# Patient Record
Sex: FEMALE | Race: WHITE | HISPANIC OR LATINO
[De-identification: names, ages, dates, MRNs, and addresses within clinical notes are randomized per-mention and may not be internally consistent; named-entity substitution may affect disease eponyms.]

---

## 2017-01-04 ENCOUNTER — APPOINTMENT (OUTPATIENT)
Dept: OBGYN | Facility: CLINIC | Age: 42
End: 2017-01-04

## 2017-01-04 ENCOUNTER — LABORATORY RESULT (OUTPATIENT)
Age: 42
End: 2017-01-04

## 2017-01-04 ENCOUNTER — OUTPATIENT (OUTPATIENT)
Dept: OUTPATIENT SERVICES | Facility: HOSPITAL | Age: 42
LOS: 1 days | End: 2017-01-04
Payer: MEDICAID

## 2017-01-04 VITALS
HEIGHT: 65 IN | BODY MASS INDEX: 25.68 KG/M2 | SYSTOLIC BLOOD PRESSURE: 118 MMHG | WEIGHT: 154.13 LBS | DIASTOLIC BLOOD PRESSURE: 79 MMHG

## 2017-01-04 DIAGNOSIS — Z87.39 PERSONAL HISTORY OF OTHER DISEASES OF THE MUSCULOSKELETAL SYSTEM AND CONNECTIVE TISSUE: ICD-10-CM

## 2017-01-04 DIAGNOSIS — Z87.2 PERSONAL HISTORY OF DISEASES OF THE SKIN AND SUBCUTANEOUS TISSUE: ICD-10-CM

## 2017-01-04 DIAGNOSIS — M51.36 OTHER INTERVERTEBRAL DISC DEGENERATION, LUMBAR REGION: ICD-10-CM

## 2017-01-04 DIAGNOSIS — M54.32 SCIATICA, LEFT SIDE: ICD-10-CM

## 2017-01-04 DIAGNOSIS — Z87.898 PERSONAL HISTORY OF OTHER SPECIFIED CONDITIONS: ICD-10-CM

## 2017-01-04 DIAGNOSIS — Z86.69 PERSONAL HISTORY OF OTHER DISEASES OF THE NERVOUS SYSTEM AND SENSE ORGANS: ICD-10-CM

## 2017-01-04 DIAGNOSIS — Z86.11 PERSONAL HISTORY OF TUBERCULOSIS: ICD-10-CM

## 2017-01-04 DIAGNOSIS — L81.8 OTHER SPECIFIED DISORDERS OF PIGMENTATION: ICD-10-CM

## 2017-01-04 DIAGNOSIS — M54.16 RADICULOPATHY, LUMBAR REGION: ICD-10-CM

## 2017-01-04 DIAGNOSIS — H91.90 UNSPECIFIED HEARING LOSS, UNSPECIFIED EAR: ICD-10-CM

## 2017-01-04 DIAGNOSIS — D22.9 MELANOCYTIC NEVI, UNSPECIFIED: ICD-10-CM

## 2017-01-04 DIAGNOSIS — Z87.09 PERSONAL HISTORY OF OTHER DISEASES OF THE RESPIRATORY SYSTEM: ICD-10-CM

## 2017-01-04 DIAGNOSIS — N76.0 ACUTE VAGINITIS: ICD-10-CM

## 2017-01-04 DIAGNOSIS — Z86.39 PERSONAL HISTORY OF OTHER ENDOCRINE, NUTRITIONAL AND METABOLIC DISEASE: ICD-10-CM

## 2017-01-04 DIAGNOSIS — I83.90 ASYMPTOMATIC VARICOSE VEINS OF UNSPECIFIED LOWER EXTREMITY: ICD-10-CM

## 2017-01-04 DIAGNOSIS — H54.7 UNSPECIFIED VISUAL LOSS: ICD-10-CM

## 2017-01-04 DIAGNOSIS — Z01.419 ENCOUNTER FOR GYNECOLOGICAL EXAMINATION (GENERAL) (ROUTINE) W/OUT ABNORMAL FINDINGS: ICD-10-CM

## 2017-01-04 DIAGNOSIS — Z01.419 ENCOUNTER FOR GYNECOLOGICAL EXAMINATION (GENERAL) (ROUTINE) WITHOUT ABNORMAL FINDINGS: ICD-10-CM

## 2017-01-04 DIAGNOSIS — M25.561 PAIN IN RIGHT KNEE: ICD-10-CM

## 2017-01-04 PROCEDURE — 87591 N.GONORRHOEAE DNA AMP PROB: CPT

## 2017-01-04 PROCEDURE — G0463: CPT

## 2017-01-04 PROCEDURE — 87491 CHLMYD TRACH DNA AMP PROBE: CPT

## 2017-01-05 LAB
C TRACH RRNA SPEC QL NAA+PROBE: SIGNIFICANT CHANGE UP
C TRACH RRNA SPEC QL NAA+PROBE: SIGNIFICANT CHANGE UP
GC AMPLIFICATION INTERPRETATION: SIGNIFICANT CHANGE UP
N GONORRHOEA RRNA SPEC QL NAA+PROBE: SIGNIFICANT CHANGE UP
SPECIMEN SOURCE: SIGNIFICANT CHANGE UP

## 2017-07-07 ENCOUNTER — APPOINTMENT (OUTPATIENT)
Dept: INTERNAL MEDICINE | Facility: CLINIC | Age: 42
End: 2017-07-07

## 2017-07-07 ENCOUNTER — OUTPATIENT (OUTPATIENT)
Dept: OUTPATIENT SERVICES | Facility: HOSPITAL | Age: 42
LOS: 1 days | End: 2017-07-07
Payer: MEDICAID

## 2017-07-07 VITALS
BODY MASS INDEX: 25.33 KG/M2 | HEIGHT: 65 IN | WEIGHT: 152 LBS | DIASTOLIC BLOOD PRESSURE: 70 MMHG | SYSTOLIC BLOOD PRESSURE: 110 MMHG

## 2017-07-07 DIAGNOSIS — I10 ESSENTIAL (PRIMARY) HYPERTENSION: ICD-10-CM

## 2017-07-07 PROCEDURE — G0463: CPT

## 2017-07-11 DIAGNOSIS — G89.29 OTHER CHRONIC PAIN: ICD-10-CM

## 2017-07-11 DIAGNOSIS — M54.5 LOW BACK PAIN: ICD-10-CM

## 2017-12-19 ENCOUNTER — APPOINTMENT (OUTPATIENT)
Dept: INTERNAL MEDICINE | Facility: CLINIC | Age: 42
End: 2017-12-19
Payer: MEDICAID

## 2017-12-19 ENCOUNTER — MED ADMIN CHARGE (OUTPATIENT)
Age: 42
End: 2017-12-19

## 2017-12-19 PROCEDURE — G0008: CPT

## 2017-12-19 PROCEDURE — 90688 IIV4 VACCINE SPLT 0.5 ML IM: CPT

## 2017-12-27 ENCOUNTER — LABORATORY RESULT (OUTPATIENT)
Age: 42
End: 2017-12-27

## 2017-12-27 ENCOUNTER — APPOINTMENT (OUTPATIENT)
Dept: INTERNAL MEDICINE | Facility: CLINIC | Age: 42
End: 2017-12-27
Payer: MEDICAID

## 2017-12-27 ENCOUNTER — OUTPATIENT (OUTPATIENT)
Dept: OUTPATIENT SERVICES | Facility: HOSPITAL | Age: 42
LOS: 1 days | End: 2017-12-27
Payer: MEDICAID

## 2017-12-27 VITALS
SYSTOLIC BLOOD PRESSURE: 120 MMHG | HEART RATE: 81 BPM | BODY MASS INDEX: 26.16 KG/M2 | WEIGHT: 157 LBS | HEIGHT: 65 IN | OXYGEN SATURATION: 98 % | DIASTOLIC BLOOD PRESSURE: 74 MMHG

## 2017-12-27 DIAGNOSIS — I10 ESSENTIAL (PRIMARY) HYPERTENSION: ICD-10-CM

## 2017-12-27 PROCEDURE — G0463: CPT

## 2017-12-27 PROCEDURE — 99213 OFFICE O/P EST LOW 20 MIN: CPT | Mod: GE

## 2017-12-27 PROCEDURE — 82306 VITAMIN D 25 HYDROXY: CPT

## 2017-12-28 LAB — 24R-OH-CALCIDIOL SERPL-MCNC: 21.7 NG/ML — LOW (ref 30–80)

## 2017-12-29 LAB
ALBUMIN SERPL ELPH-MCNC: 4.6 G/DL
ALP BLD-CCNC: 51 U/L
ALT SERPL-CCNC: 23 U/L
ANION GAP SERPL CALC-SCNC: 12 MMOL/L
AST SERPL-CCNC: 14 U/L
BILIRUB SERPL-MCNC: 0.5 MG/DL
BUN SERPL-MCNC: 11 MG/DL
CALCIUM SERPL-MCNC: 10 MG/DL
CHLORIDE SERPL-SCNC: 99 MMOL/L
CO2 SERPL-SCNC: 26 MMOL/L
CREAT SERPL-MCNC: 0.64 MG/DL
GLUCOSE SERPL-MCNC: 106 MG/DL
POTASSIUM SERPL-SCNC: 4.9 MMOL/L
PROT SERPL-MCNC: 7.8 G/DL
SODIUM SERPL-SCNC: 137 MMOL/L

## 2018-01-02 DIAGNOSIS — E55.9 VITAMIN D DEFICIENCY, UNSPECIFIED: ICD-10-CM

## 2018-01-02 DIAGNOSIS — K82.4 CHOLESTEROLOSIS OF GALLBLADDER: ICD-10-CM

## 2019-09-06 ENCOUNTER — TRANSCRIPTION ENCOUNTER (OUTPATIENT)
Age: 44
End: 2019-09-06

## 2019-09-26 ENCOUNTER — TRANSCRIPTION ENCOUNTER (OUTPATIENT)
Age: 44
End: 2019-09-26

## 2019-09-28 ENCOUNTER — EMERGENCY (EMERGENCY)
Facility: HOSPITAL | Age: 44
LOS: 1 days | Discharge: HOME | End: 2019-09-28
Attending: EMERGENCY MEDICINE
Payer: SELF-PAY

## 2019-09-28 VITALS
TEMPERATURE: 99 F | HEART RATE: 87 BPM | OXYGEN SATURATION: 99 % | DIASTOLIC BLOOD PRESSURE: 77 MMHG | SYSTOLIC BLOOD PRESSURE: 122 MMHG | RESPIRATION RATE: 18 BRPM

## 2019-09-28 VITALS
DIASTOLIC BLOOD PRESSURE: 85 MMHG | TEMPERATURE: 100 F | RESPIRATION RATE: 18 BRPM | HEART RATE: 98 BPM | HEIGHT: 65 IN | OXYGEN SATURATION: 99 % | WEIGHT: 164.91 LBS | SYSTOLIC BLOOD PRESSURE: 129 MMHG

## 2019-09-28 DIAGNOSIS — H60.90 UNSPECIFIED OTITIS EXTERNA, UNSPECIFIED EAR: ICD-10-CM

## 2019-09-28 LAB
ALBUMIN SERPL ELPH-MCNC: 3.9 G/DL — SIGNIFICANT CHANGE UP (ref 3.3–5)
ALP SERPL-CCNC: 55 U/L — SIGNIFICANT CHANGE UP (ref 40–120)
ALT FLD-CCNC: 24 U/L — SIGNIFICANT CHANGE UP (ref 10–45)
ANION GAP SERPL CALC-SCNC: 8 MMOL/L — SIGNIFICANT CHANGE UP (ref 5–17)
AST SERPL-CCNC: 10 U/L — SIGNIFICANT CHANGE UP (ref 10–40)
BASOPHILS # BLD AUTO: 0 K/UL — SIGNIFICANT CHANGE UP (ref 0–0.2)
BASOPHILS NFR BLD AUTO: 0.3 % — SIGNIFICANT CHANGE UP (ref 0–2)
BILIRUB SERPL-MCNC: 0.3 MG/DL — SIGNIFICANT CHANGE UP (ref 0.2–1.2)
BUN SERPL-MCNC: 8 MG/DL — SIGNIFICANT CHANGE UP (ref 7–23)
CALCIUM SERPL-MCNC: 8.6 MG/DL — SIGNIFICANT CHANGE UP (ref 8.4–10.5)
CHLORIDE SERPL-SCNC: 100 MMOL/L — SIGNIFICANT CHANGE UP (ref 96–108)
CO2 SERPL-SCNC: 25 MMOL/L — SIGNIFICANT CHANGE UP (ref 22–31)
CREAT SERPL-MCNC: 0.68 MG/DL — SIGNIFICANT CHANGE UP (ref 0.5–1.3)
EOSINOPHIL # BLD AUTO: 0.1 K/UL — SIGNIFICANT CHANGE UP (ref 0–0.5)
EOSINOPHIL NFR BLD AUTO: 0.5 % — SIGNIFICANT CHANGE UP (ref 0–6)
GLUCOSE SERPL-MCNC: 178 MG/DL — HIGH (ref 70–99)
HCG UR QL: NEGATIVE — SIGNIFICANT CHANGE UP
HCT VFR BLD CALC: 34.8 % — SIGNIFICANT CHANGE UP (ref 34.5–45)
HGB BLD-MCNC: 12.1 G/DL — SIGNIFICANT CHANGE UP (ref 11.5–15.5)
LYMPHOCYTES # BLD AUTO: 1.5 K/UL — SIGNIFICANT CHANGE UP (ref 1–3.3)
LYMPHOCYTES # BLD AUTO: 11.8 % — LOW (ref 13–44)
MCHC RBC-ENTMCNC: 32.5 PG — SIGNIFICANT CHANGE UP (ref 27–34)
MCHC RBC-ENTMCNC: 34.8 GM/DL — SIGNIFICANT CHANGE UP (ref 32–36)
MCV RBC AUTO: 93.5 FL — SIGNIFICANT CHANGE UP (ref 80–100)
MONOCYTES # BLD AUTO: 1.2 K/UL — HIGH (ref 0–0.9)
MONOCYTES NFR BLD AUTO: 9.2 % — SIGNIFICANT CHANGE UP (ref 2–14)
NEUTROPHILS # BLD AUTO: 10 K/UL — HIGH (ref 1.8–7.4)
NEUTROPHILS NFR BLD AUTO: 78.3 % — HIGH (ref 43–77)
PLATELET # BLD AUTO: 321 K/UL — SIGNIFICANT CHANGE UP (ref 150–400)
POTASSIUM SERPL-MCNC: 4 MMOL/L — SIGNIFICANT CHANGE UP (ref 3.5–5.3)
POTASSIUM SERPL-SCNC: 4 MMOL/L — SIGNIFICANT CHANGE UP (ref 3.5–5.3)
PROT SERPL-MCNC: 7 G/DL — SIGNIFICANT CHANGE UP (ref 6–8.3)
RBC # BLD: 3.72 M/UL — LOW (ref 3.8–5.2)
RBC # FLD: 10.9 % — SIGNIFICANT CHANGE UP (ref 10.3–14.5)
SODIUM SERPL-SCNC: 133 MMOL/L — LOW (ref 135–145)
WBC # BLD: 12.8 K/UL — HIGH (ref 3.8–10.5)
WBC # FLD AUTO: 12.8 K/UL — HIGH (ref 3.8–10.5)

## 2019-09-28 PROCEDURE — 70481 CT ORBIT/EAR/FOSSA W/DYE: CPT | Mod: 26

## 2019-09-28 PROCEDURE — 99285 EMERGENCY DEPT VISIT HI MDM: CPT

## 2019-09-28 PROCEDURE — 80053 COMPREHEN METABOLIC PANEL: CPT

## 2019-09-28 PROCEDURE — 70481 CT ORBIT/EAR/FOSSA W/DYE: CPT

## 2019-09-28 PROCEDURE — 87070 CULTURE OTHR SPECIMN AEROBIC: CPT

## 2019-09-28 PROCEDURE — 96376 TX/PRO/DX INJ SAME DRUG ADON: CPT | Mod: XU

## 2019-09-28 PROCEDURE — 99283 EMERGENCY DEPT VISIT LOW MDM: CPT

## 2019-09-28 PROCEDURE — 81025 URINE PREGNANCY TEST: CPT

## 2019-09-28 PROCEDURE — 87186 SC STD MICRODIL/AGAR DIL: CPT

## 2019-09-28 PROCEDURE — 96374 THER/PROPH/DIAG INJ IV PUSH: CPT | Mod: XU

## 2019-09-28 PROCEDURE — 99284 EMERGENCY DEPT VISIT MOD MDM: CPT | Mod: 25

## 2019-09-28 PROCEDURE — 96375 TX/PRO/DX INJ NEW DRUG ADDON: CPT | Mod: XU

## 2019-09-28 PROCEDURE — 85027 COMPLETE CBC AUTOMATED: CPT

## 2019-09-28 RX ORDER — ACETAMINOPHEN 500 MG
975 TABLET ORAL ONCE
Refills: 0 | Status: COMPLETED | OUTPATIENT
Start: 2019-09-28 | End: 2019-09-28

## 2019-09-28 RX ORDER — MORPHINE SULFATE 50 MG/1
4 CAPSULE, EXTENDED RELEASE ORAL ONCE
Refills: 0 | Status: DISCONTINUED | OUTPATIENT
Start: 2019-09-28 | End: 2019-09-28

## 2019-09-28 RX ORDER — OFLOXACIN OTIC SOLUTION 3 MG/ML
1 SOLUTION/ DROPS AURICULAR (OTIC) ONCE
Refills: 0 | Status: DISCONTINUED | OUTPATIENT
Start: 2019-09-28 | End: 2019-09-28

## 2019-09-28 RX ORDER — CIPROFLOXACIN LACTATE 400MG/40ML
400 VIAL (ML) INTRAVENOUS ONCE
Refills: 0 | Status: COMPLETED | OUTPATIENT
Start: 2019-09-28 | End: 2019-09-28

## 2019-09-28 RX ORDER — SODIUM CHLORIDE 0.65 %
2 AEROSOL, SPRAY (ML) NASAL
Qty: 50 | Refills: 0
Start: 2019-09-28 | End: 2019-10-04

## 2019-09-28 RX ORDER — CIPROFLOXACIN HCL 0.3 %
1 DROPS OPHTHALMIC (EYE) ONCE
Refills: 0 | Status: DISCONTINUED | OUTPATIENT
Start: 2019-09-28 | End: 2019-09-28

## 2019-09-28 RX ORDER — CIPROFLOXACIN AND DEXAMETHASONE 3; 1 MG/ML; MG/ML
1 SUSPENSION/ DROPS AURICULAR (OTIC) ONCE
Refills: 0 | Status: DISCONTINUED | OUTPATIENT
Start: 2019-09-28 | End: 2019-09-28

## 2019-09-28 RX ORDER — CIPROFLOXACIN AND DEXAMETHASONE 3; 1 MG/ML; MG/ML
4 SUSPENSION/ DROPS AURICULAR (OTIC)
Qty: 50 | Refills: 0
Start: 2019-09-28 | End: 2019-10-04

## 2019-09-28 RX ORDER — KETOROLAC TROMETHAMINE 30 MG/ML
15 SYRINGE (ML) INJECTION ONCE
Refills: 0 | Status: DISCONTINUED | OUTPATIENT
Start: 2019-09-28 | End: 2019-09-28

## 2019-09-28 RX ORDER — OFLOXACIN 0.3 %
5 DROPS OPHTHALMIC (EYE) ONCE
Refills: 0 | Status: COMPLETED | OUTPATIENT
Start: 2019-09-28 | End: 2019-09-28

## 2019-09-28 RX ORDER — CIPROFLOXACIN LACTATE 400MG/40ML
1 VIAL (ML) INTRAVENOUS
Qty: 6 | Refills: 0
Start: 2019-09-28 | End: 2019-10-03

## 2019-09-28 RX ORDER — SODIUM CHLORIDE 9 MG/ML
1000 INJECTION INTRAMUSCULAR; INTRAVENOUS; SUBCUTANEOUS ONCE
Refills: 0 | Status: COMPLETED | OUTPATIENT
Start: 2019-09-28 | End: 2019-09-28

## 2019-09-28 RX ADMIN — SODIUM CHLORIDE 1000 MILLILITER(S): 9 INJECTION INTRAMUSCULAR; INTRAVENOUS; SUBCUTANEOUS at 04:31

## 2019-09-28 RX ADMIN — Medication 15 MILLIGRAM(S): at 08:12

## 2019-09-28 RX ADMIN — Medication 200 MILLIGRAM(S): at 04:31

## 2019-09-28 RX ADMIN — MORPHINE SULFATE 4 MILLIGRAM(S): 50 CAPSULE, EXTENDED RELEASE ORAL at 05:45

## 2019-09-28 RX ADMIN — Medication 975 MILLIGRAM(S): at 13:57

## 2019-09-28 RX ADMIN — Medication 15 MILLIGRAM(S): at 14:04

## 2019-09-28 RX ADMIN — MORPHINE SULFATE 4 MILLIGRAM(S): 50 CAPSULE, EXTENDED RELEASE ORAL at 05:00

## 2019-09-28 RX ADMIN — Medication 975 MILLIGRAM(S): at 09:12

## 2019-09-28 RX ADMIN — Medication 5 DROP(S): at 10:12

## 2019-09-28 RX ADMIN — Medication 15 MILLIGRAM(S): at 13:58

## 2019-09-28 RX ADMIN — Medication 975 MILLIGRAM(S): at 07:28

## 2019-09-28 RX ADMIN — Medication 975 MILLIGRAM(S): at 14:04

## 2019-09-28 RX ADMIN — MORPHINE SULFATE 4 MILLIGRAM(S): 50 CAPSULE, EXTENDED RELEASE ORAL at 04:32

## 2019-09-28 RX ADMIN — Medication 15 MILLIGRAM(S): at 07:28

## 2019-09-28 NOTE — CONSULT NOTE ADULT - PROBLEM SELECTOR RECOMMENDATION 9
- CT IAC pending.   - Ear wick placed.  - Recommend Ciprodex 4gtt BID to Left Ear x 7 days.   - Also Levaquin PO x 5 days.   - f/u in ENT clinic in 5 days for wick removal.   - f/u Left Ear Cx. can f/u in the clinic for results.   - Pain meds prn.  - Call ENT with questions.     LEI ARMENTA PA-C.   # [id number]  ENT. - CT IAC: Otitis Externa and Otitis Media, no mastoiditis.  - Ear canal cleaned and Culture obtained from Left Ear, followed by Ear wick placement.  - Recommend Ciprodex 4gtt BID to Left Ear x 7 days.   - Also Levaquin PO x 5 days.   - NS nasal spray 0.65% 2gtt TID.   - f/u in ENT clinic in 4-5 days for wick removal.   - f/u Left Ear Cx. can f/u in the clinic for results.   - Pain meds prn.  - Call ENT with questions.     LEI ARMENTA PA-C.   # [id number]  ENT. - CT IAC: Otitis Externa and Otitis Media, no mastoiditis.  - Ear canal cleaned and Culture obtained from Left Ear, followed by Ear wick placement.  - Recommend Ciprodex 4gtt BID to Left Ear x 7 days.   - Also Levaquin PO x 5 days.   - NS nasal spray 0.65% 2gtt TID to b/l Nares.  - f/u in ENT clinic in 4-5 days for wick removal.   - f/u Left Ear Cx. can f/u in the clinic for results.   - Pain meds prn.  - D/W [facility] and agrees with plan of care.   - Call ENT with questions.     LEI ARMENTA PA-C.   # [id number]  ENT.

## 2019-09-28 NOTE — ED PROVIDER NOTE - OBJECTIVE STATEMENT
44F w/out pertinent pmh - p/w L ear pain and drainage x 1 wk worsening. 3 wks ago had L ear pain, went to  got ear drops and improved; similar sx recurred 1 wk ago, however has been worsening despite ofloxacin gtt x 2 days. +low grade temps , taking tylenol/ibuprofen consistently but pain not improving. Took son's percocet 1130pm tonight w/ minimal relief. No d/c, chest / abd pain, cough, dizziness, dysuria/hematuria. No recent travel, or hospitalization.

## 2019-09-28 NOTE — CONSULT NOTE ADULT - ASSESSMENT
45 YO F with no significant PMH presents to ED with Left Ear Pain. Pt with 2 episodes of Left Ear pain this month, doesn't got relived with ocuflox gtt as outpatient,. Also feeling " wet sensation" inside the ear. Pt had a 100.2F fever at home. On Physical exam,  Pain upon Tragus pulling, TTP Pre- auricular area,  EAC Edema with mild purulent drainage, unable to fully visualize TM due to edema, No mastoid tenderness, erythema, or ear bulging. Pt with leukocytosis upto 12.8, with a low grade fever 99.5F in ED. Ear canal cleaned and Culture obtained from Left Ear, followed by Ear wick placement.  CT IAC showed: 45 YO F with no significant PMH presents to ED with Left Ear Pain. Pt with 2 episodes of Left Ear pain this month, doesn't got relived with ocuflox gtt as outpatient,. Also feeling " wet sensation" inside the ear. Pt had a 100.2F fever at home. On Ear exam,  Pain upon Tragus pulling, TTP Pre- auricular area,  EAC Edema with mild purulent drainage, unable to fully visualize TM due to edema, No mastoid tenderness, erythema, or ear bulging. Pt with leukocytosis upto 12.8, with a low grade fever 99.5F in ED. Ear canal cleaned and Culture obtained from Left Ear, followed by Ear wick placement.  CT IAC showed: 43 YO F with no significant PMH presents to ED with Left Ear Pain. Pt with 2 episodes of Left Ear pain this month, doesn't got relived with ocuflox gtt as outpatient,. Also feeling " wet sensation" inside the ear. Pt had a 100.2F fever at home. On Ear exam,  Pain upon Tragus pulling, TTP Pre- auricular area,  EAC Edema with mild purulent drainage, unable to fully visualize TM due to edema, No mastoid tenderness, erythema, or ear bulging. Pt with leukocytosis upto 12.8, with a low grade fever 99.5F in ED. Ear canal cleaned and Culture obtained from Left Ear, followed by Ear wick placement.  CT IAC showed: Otitis Externa and Otitis Media, no mastoiditis

## 2019-09-28 NOTE — ED PROVIDER NOTE - PHYSICAL EXAMINATION
*GEN:   uncomfortable, AOx3  *EYES:   pupils equally round and reactive to light, extra-occular movements intact  *HEENT:   L external ear canal swollen and tender to exam w/ small purulence; L jack-auricular tenderness to pain; airway patent, moist mucosal membranes, full ROM neck  *CV:   regular rate and rhythm  *RESP:   clear to auscultation bilaterally, non-labored  *ABD:   soft, non-tender  *:   no cva/flank tenderness  *EXTREM:   no MSK tenderness, full ROM throughout, no leg swelling  *SKIN:   dry, intact  *NEURO:   AOx3, no focal weakness or loss of sensation

## 2019-09-28 NOTE — ED PROVIDER NOTE - CLINICAL SUMMARY MEDICAL DECISION MAKING FREE TEXT BOX
44F w/ L ear pain concerning for mastoiditis - will check labs, CT head, give antibiotics and sx relief, reassess 44F w/ L ear pain concerning for mastoiditis - will check labs, CT head, give antibiotics and sx relief, reassess    Attending Statement: Agree with the above.  L purulent d/c from ear c pain on pulling/post-auricular ear/+- mastoid.  Pt c significant discomfort.  Will tx c IV cipro, give fluids, check labs, obtain CT IAC, likely s/w ENT.  --BMM

## 2019-09-28 NOTE — ED ADULT TRIAGE NOTE - CHIEF COMPLAINT QUOTE
Patient c/o severe left ear pain for about 3 weeks, gradually getting worse, unable to sleep at night. Took Percocet at 2330 pm. "help for a little bit". Patient went to Urgent care on Thursday and prescribed ear drops antibiotic.

## 2019-09-28 NOTE — ED PROVIDER NOTE - PATIENT PORTAL LINK FT
You can access the FollowMyHealth Patient Portal offered by [facility] by registering at the following website: [email or web address]. By joining [employer]’s FollowMyHealth portal, you will also be able to view your health information using other applications (apps) compatible with our system.

## 2019-09-28 NOTE — ED ADULT NURSE NOTE - CHPI ED NUR SYMPTOMS NEG
no tingling/no decreased eating/drinking/no chills/no dizziness/no fever/no weakness/no nausea/no vomiting

## 2019-09-28 NOTE — ED PROVIDER NOTE - NSFOLLOWUPCLINICS_GEN_ALL_ED_FT
[facility] - ENT  Otolaryngology (ENT)  [address] [place] [address]  Phone: [phone]  Fax:   Follow Up Time: 4-6 Days

## 2019-09-28 NOTE — ED PROVIDER NOTE - CARE PLAN
Principal Discharge DX:	Otitis externa  Secondary Diagnosis:	Otitis media Principal Discharge DX:	Otitis externa  Goal:	L  Secondary Diagnosis:	Otitis media

## 2019-09-28 NOTE — ED PROVIDER NOTE - NSFOLLOWUPINSTRUCTIONS_ED_ALL_ED_FT
Follow up with your primary care doctor  Return to ED for any new or worsening symptoms   Administer Ciprodex 4drops twice a day to Left Ear x 7 days. Keep ear wick in place. Turn head with left ear up and place drops and allow several minutes for wick to absorb.   Take Levaquin as prescribed orally for 5 days  Apply normal saline nasal spray 0.65% 2sprays 3 times a day  Follow up with ENT clinic in 4-5 days for wick removal and follow up Follow up with your primary care doctor  Return to ED for any new or worsening symptoms   Administer Ciprodex 4drops twice a day to Left Ear x 7 days. Keep ear wick in place. Turn head with left ear up and place drops and allow several minutes for wick to absorb.   Take Levaquin as prescribed orally for 5 days  Apply normal saline nasal spray 0.65% 2sprays 3 times a day  Follow up with ENT clinic in 4-5 days for wick removal and follow up  Take motrin 600mg every 6 hours for pain. Can also take tylenol 500mg every 6 hours as needed for pain.

## 2019-09-30 ENCOUNTER — LABORATORY RESULT (OUTPATIENT)
Age: 44
End: 2019-09-30

## 2019-09-30 ENCOUNTER — APPOINTMENT (OUTPATIENT)
Dept: INTERNAL MEDICINE | Facility: CLINIC | Age: 44
End: 2019-09-30
Payer: MEDICAID

## 2019-09-30 ENCOUNTER — OUTPATIENT (OUTPATIENT)
Dept: OUTPATIENT SERVICES | Facility: HOSPITAL | Age: 44
LOS: 1 days | End: 2019-09-30
Payer: SELF-PAY

## 2019-09-30 VITALS
OXYGEN SATURATION: 98 % | SYSTOLIC BLOOD PRESSURE: 138 MMHG | BODY MASS INDEX: 26.99 KG/M2 | HEIGHT: 65 IN | HEART RATE: 77 BPM | DIASTOLIC BLOOD PRESSURE: 80 MMHG | WEIGHT: 162 LBS

## 2019-09-30 VITALS — SYSTOLIC BLOOD PRESSURE: 116 MMHG | DIASTOLIC BLOOD PRESSURE: 72 MMHG

## 2019-09-30 DIAGNOSIS — I10 ESSENTIAL (PRIMARY) HYPERTENSION: ICD-10-CM

## 2019-09-30 DIAGNOSIS — E55.9 VITAMIN D DEFICIENCY, UNSPECIFIED: ICD-10-CM

## 2019-09-30 LAB
CHOLEST SERPL-MCNC: 146 MG/DL — SIGNIFICANT CHANGE UP (ref 10–199)
ESTIMATED AVERAGE GLUCOSE: 126 MG/DL — HIGH (ref 68–114)
HBA1C BLD-MCNC: 6 % — HIGH (ref 4–5.6)
HDLC SERPL-MCNC: 43 MG/DL — LOW
LIPID PNL WITH DIRECT LDL SERPL: 83 MG/DL — SIGNIFICANT CHANGE UP
TOTAL CHOLESTEROL/HDL RATIO MEASUREMENT: 3.4 RATIO — SIGNIFICANT CHANGE UP (ref 3.3–7.1)
TRIGL SERPL-MCNC: 101 MG/DL — SIGNIFICANT CHANGE UP (ref 10–149)

## 2019-09-30 PROCEDURE — 99396 PREV VISIT EST AGE 40-64: CPT | Mod: GC

## 2019-09-30 PROCEDURE — 80061 LIPID PANEL: CPT

## 2019-09-30 PROCEDURE — 83036 HEMOGLOBIN GLYCOSYLATED A1C: CPT

## 2019-09-30 PROCEDURE — G0463: CPT

## 2019-09-30 NOTE — REVIEW OF SYSTEMS
[Earache] : earache [Joint Pain] : joint pain [Fever] : no fever [Chills] : no chills [Sore Throat] : no sore throat [Chest Pain] : no chest pain [Shortness Of Breath] : no shortness of breath [Abdominal Pain] : no abdominal pain [Nausea] : no nausea [Vomiting] : no vomiting [Dysuria] : no dysuria [Back Pain] : no back pain [Itching] : no itching [Skin Rash] : no skin rash [Headache] : no headache [Dizziness] : no dizziness [Depression] : no depression

## 2019-09-30 NOTE — PHYSICAL EXAM
[No Acute Distress] : no acute distress [Well Nourished] : well nourished [PERRL] : pupils equal round and reactive to light [EOMI] : extraocular movements intact [No Respiratory Distress] : no respiratory distress  [No Accessory Muscle Use] : no accessory muscle use [Normal Rate] : normal rate  [Regular Rhythm] : with a regular rhythm [Normal S1, S2] : normal S1 and S2 [No Edema] : there was no peripheral edema [Soft] : abdomen soft [Non Tender] : non-tender [No HSM] : no HSM [Normal Bowel Sounds] : normal bowel sounds [Normal Gait] : normal gait [No Rash] : no rash [Alert and Oriented x3] : oriented to person, place, and time [Normal Oropharynx] : the oropharynx was normal [Normal Posterior Cervical Nodes] : no posterior cervical lymphadenopathy [Normal Anterior Cervical Nodes] : no anterior cervical lymphadenopathy [de-identified] : + yuri in L ear

## 2019-09-30 NOTE — ASSESSMENT
[FreeTextEntry1] : 44 F with no significant PMH who presents for a CPE.\par \par 1) HCM\par - last PAP 2015 negative --> repeat in 2020\par - mammogram referral given \par - CBC and CMP checked at hospital --> lipid panel and A1c ordered\par - will get flu shot at pharmacy \par - ophtho referral given\par \par 2) Acute otitis media/externa\par - patient on oral levofloxacin and cipro/dexamethasone eye drops\par - follow up ENT this week for wick removal \par \par 3) Overweight \par - patient counseled on healthy lifestyle habits including dietary modifications and increase in exercise \par \par 4) Depression \par - patient with PHQ 9 score of 6\par - patient under stress given she is single mother with son with severe anxiety and ADHD\par - no concern for harm to herself or others, she is not interested in medication at this time but would like to speak with someone \par - behavioral health referral given \par \par RTC in 6 months to 1 year

## 2019-09-30 NOTE — HISTORY OF PRESENT ILLNESS
[FreeTextEntry1] : CPE [de-identified] : 43 y/o F with sciatica and headaches who presents for a CPE.\par \par Since her last CPE in December 2017, she has been doing well overall however she developed itching in her L ear in April. She went to an urgent care center and was prescribed 7 days of L ear drops.\par \par She states that 2 weeks ago she developed itching again and she went back to urgent care on Thursday morning and was prescribed more eye drops. The pain in her ear worsened on Friday so she went to the ED and was diagnosed with acute otitis media and externa with cellulitis. She was seen by ENT and had a wick placed in her L ear. She was prescribed PO levoquin and cipro/dexamethasone ear drops. She says her pain is more manageable now and she is to follow up with ENT within the next few days to have the wick removed. \par \par Overall, she states she is concerned about weight gain. She has been cutting out carbs from her diet and tries to use her stationary bike 2x per week. \par \par HCM - Last PAP was in 2015 and was negative so due in 2020. Never had a mammogram in the past. Will order referral today. \par \par She is a single mom with 3 kids. 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 Takes care of her mom in hospice as well so has a lot of stress at home but she is managing it. No smoking, alcohol, or recreational drugs.

## 2019-10-01 LAB
-  AMIKACIN: SIGNIFICANT CHANGE UP
-  AZTREONAM: SIGNIFICANT CHANGE UP
-  CEFEPIME: SIGNIFICANT CHANGE UP
-  CEFTAZIDIME: SIGNIFICANT CHANGE UP
-  CIPROFLOXACIN: SIGNIFICANT CHANGE UP
-  GENTAMICIN: SIGNIFICANT CHANGE UP
-  LEVOFLOXACIN: SIGNIFICANT CHANGE UP
-  MEROPENEM: SIGNIFICANT CHANGE UP
-  PIPERACILLIN/TAZOBACTAM: SIGNIFICANT CHANGE UP
-  TOBRAMYCIN: SIGNIFICANT CHANGE UP
CULTURE RESULTS: SIGNIFICANT CHANGE UP
METHOD TYPE: SIGNIFICANT CHANGE UP
ORGANISM # SPEC MICROSCOPIC CNT: SIGNIFICANT CHANGE UP
ORGANISM # SPEC MICROSCOPIC CNT: SIGNIFICANT CHANGE UP
SPECIMEN SOURCE: SIGNIFICANT CHANGE UP

## 2019-10-02 ENCOUNTER — RESULT REVIEW (OUTPATIENT)
Age: 44
End: 2019-10-02

## 2019-10-02 NOTE — ED POST DISCHARGE NOTE - RESULT SUMMARY
Culture L ear rare P. aeruginosa sensitive to FQ, pt DC'ed on topical and PO levofloxacin, appropriate care received in ED no need for further contact at this time. -Dick Hutchinson PA-C Culture L ear rare P. aeruginosa sensitive to FQ, pt DC'ed on topical and PO levofloxacin. Additionally shows few Candida parapsilosis, pt not DC'ed on antifungals. -Dick Hutchinson PA-C

## 2019-10-02 NOTE — ED POST DISCHARGE NOTE - DETAILS
results d/w pt, sxs improving but pt still has significant pruritus, sent clotrimazole solution to pt preferred pharmacy, pt has f/u with ENT tomorrow, told to return fi change in hearing, severe ear or headache, tinnitus, abnl DC/bleeding from the ear, or if any other concerns. -Dick Hutchinson PA-C

## 2019-10-03 ENCOUNTER — APPOINTMENT (OUTPATIENT)
Dept: OTOLARYNGOLOGY | Facility: CLINIC | Age: 44
End: 2019-10-03
Payer: MEDICAID

## 2019-10-03 VITALS
DIASTOLIC BLOOD PRESSURE: 92 MMHG | WEIGHT: 162 LBS | SYSTOLIC BLOOD PRESSURE: 136 MMHG | HEIGHT: 65 IN | HEART RATE: 94 BPM | BODY MASS INDEX: 26.99 KG/M2

## 2019-10-03 DIAGNOSIS — Z00.00 ENCOUNTER FOR GENERAL ADULT MEDICAL EXAMINATION WITHOUT ABNORMAL FINDINGS: ICD-10-CM

## 2019-10-03 DIAGNOSIS — E55.9 VITAMIN D DEFICIENCY, UNSPECIFIED: ICD-10-CM

## 2019-10-03 DIAGNOSIS — H60.511 ACUTE ACTINIC OTITIS EXTERNA, RIGHT EAR: ICD-10-CM

## 2019-10-03 PROCEDURE — 69210 REMOVE IMPACTED EAR WAX UNI: CPT

## 2019-10-03 PROCEDURE — 99204 OFFICE O/P NEW MOD 45 MIN: CPT | Mod: 25

## 2019-10-03 NOTE — HISTORY OF PRESENT ILLNESS
[No] : patient does not have a  history of radiation therapy [de-identified] : 45 yo female\par 5 day h/o left ear pain -severe and hearing loss\par no diabetes\par Tx in [facility] ER w/ abx and ear wick\par Wick fell out but pt still w/ sx\par chr ear itch and pt uses q-tips\par no other modifying factors\par no nasal or throat complaints\par \par  [Tinnitus] : no tinnitus [Hearing Loss] : hearing loss [Otalgia] : otalgia [Meniere Disease] : no Meniere disease [Eustachian Tube Dysfunction] : no eustachian tube dysfunction [Otosclerosis] : no otosclerosis [Cholesteatoma] : no cholesteatoma [Perilymphatic Fistula] : no perilymphatic fistula [Autoimmune Diseases] : no autoimmune diseases [Hypertension] : no hypertension [Hypotension] : no hypotension [Loud Noise Exposure] : no history of loud noise exposure [Smoking] : no smoking [Early Onset Hearing Loss] : no early onset hearing loss [Stroke] : no stroke [Facial Pain] : no facial pain [Facial Pressure] : no facial pressure [Nasal Congestion] : no nasal congestion [Ear Fullness] : ear fullness [Allergic Rhinitis] : no allergic rhinitis [Seasonal Allergies] : no seasonal allergies [Environmental Allergies] : no environmental allergies [Adenoidectomy] : no adenoidectomy [Environmental Allergens] : no environmental allergens [Asthma] : no asthma [Allergies] : no allergies [Neck Mass] : no neck mass [Neck Pain] : no neck pain [Cold Intolerance] : no cold intolerance [Chills] : no chills [Cough] : no cough [Fatigue] : no fatigue [Hyperthyroidism] : no hyperthyroidism [Heat Intolerance] : no heat intolerance [Sialadenitis] : no sialadenitis [Hodgkin Disease] : no hodgkin disease [Non-Hodgkin Lymphoma] : no non-hodgkin lymphoma [Alcohol Use] : no alcohol use [Tobacco Use] : no tobacco use [Graves Disease] : no graves disease [Thyroid Cancer] : no thyroid cancer

## 2019-10-03 NOTE — PHYSICAL EXAM
[de-identified] : left ear wax and tender w/ erythema [Midline] : trachea located in midline position [Normal] : no rashes

## 2019-10-03 NOTE — ASSESSMENT
[FreeTextEntry1] : Left Otitis externa\par rx-Cipro-po\par Medrol dospak\par d/c otic drops\par h2o precautions\par d/c q-tips\par consoder elocon lotion in future

## 2019-10-23 ENCOUNTER — APPOINTMENT (OUTPATIENT)
Dept: OTOLARYNGOLOGY | Facility: CLINIC | Age: 44
End: 2019-10-23
Payer: MEDICAID

## 2019-10-23 VITALS
SYSTOLIC BLOOD PRESSURE: 139 MMHG | WEIGHT: 162 LBS | HEIGHT: 65 IN | BODY MASS INDEX: 26.99 KG/M2 | HEART RATE: 85 BPM | DIASTOLIC BLOOD PRESSURE: 87 MMHG

## 2019-10-23 DIAGNOSIS — H61.23 IMPACTED CERUMEN, BILATERAL: ICD-10-CM

## 2019-10-23 DIAGNOSIS — J31.0 CHRONIC RHINITIS: ICD-10-CM

## 2019-10-23 DIAGNOSIS — J34.2 DEVIATED NASAL SEPTUM: ICD-10-CM

## 2019-10-23 PROCEDURE — 31231 NASAL ENDOSCOPY DX: CPT

## 2019-10-23 PROCEDURE — 99214 OFFICE O/P EST MOD 30 MIN: CPT | Mod: 25

## 2019-10-25 NOTE — HISTORY OF PRESENT ILLNESS
[No] : patient does not have a  history of radiation therapy [Hearing Loss] : hearing loss [Otalgia] : otalgia [Ear Fullness] : ear fullness [de-identified] : f/u exam s/o otic drop tx\par pain free but still w/ aural itch-moderate L>R\par hx-45 yo female\par  h/o left ear pain -severe and hearing loss\par no diabetes\par Tx in [facility] ER w/ abx and ear wick\par \par Today c/o nasal; congestion and PND-moderate x 1 wk\par Wick fell out but pt still w/ sx\par chr ear itch and pt uses q-tips\par no other modifying factors\par no nasal or throat complaints\par \par  [Tinnitus] : no tinnitus [Meniere Disease] : no Meniere disease [Eustachian Tube Dysfunction] : no eustachian tube dysfunction [Otosclerosis] : no otosclerosis [Cholesteatoma] : no cholesteatoma [Perilymphatic Fistula] : no perilymphatic fistula [Autoimmune Diseases] : no autoimmune diseases [Hypertension] : no hypertension [Hypotension] : no hypotension [Loud Noise Exposure] : no history of loud noise exposure [Smoking] : no smoking [Early Onset Hearing Loss] : no early onset hearing loss [Stroke] : no stroke [Facial Pain] : no facial pain [Facial Pressure] : no facial pressure [Nasal Congestion] : no nasal congestion [Allergic Rhinitis] : no allergic rhinitis [Environmental Allergies] : no environmental allergies [Seasonal Allergies] : no seasonal allergies [Environmental Allergens] : no environmental allergens [Allergies] : no allergies [Adenoidectomy] : no adenoidectomy [Neck Mass] : no neck mass [Asthma] : no asthma [Neck Pain] : no neck pain [Chills] : no chills [Cold Intolerance] : no cold intolerance [Cough] : no cough [Heat Intolerance] : no heat intolerance [Hyperthyroidism] : no hyperthyroidism [Fatigue] : no fatigue [Sialadenitis] : no sialadenitis [Hodgkin Disease] : no hodgkin disease [Non-Hodgkin Lymphoma] : no non-hodgkin lymphoma [Tobacco Use] : no tobacco use [Alcohol Use] : no alcohol use [Graves Disease] : no graves disease [Thyroid Cancer] : no thyroid cancer

## 2019-10-25 NOTE — PHYSICAL EXAM
[Midline] : trachea located in midline position [Normal] : salivary glands are normal [Nasal Endoscopy Performed] : nasal endoscopy was performed, see procedure section for findings [] : septum deviated bilaterally [de-identified] : congested

## 2019-10-25 NOTE — ASSESSMENT
[FreeTextEntry1] : Left Otitis externa resolved\par still w/ aural itch\par rx-Elocon lotion to ears\par d/c q-tips\par \par DNS and Rhinitis\par Rx\par   Steam humidification and hypertonic saline nasal irrigations \par consider Flonase tx\par \par \par \par f/u in spring 2020 and prn

## 2020-01-17 ENCOUNTER — OUTPATIENT (OUTPATIENT)
Dept: OUTPATIENT SERVICES | Facility: HOSPITAL | Age: 45
LOS: 1 days | End: 2020-01-17
Payer: MEDICAID

## 2020-01-17 ENCOUNTER — APPOINTMENT (OUTPATIENT)
Dept: INTERNAL MEDICINE | Facility: CLINIC | Age: 45
End: 2020-01-17
Payer: MEDICAID

## 2020-01-17 VITALS
BODY MASS INDEX: 26.66 KG/M2 | DIASTOLIC BLOOD PRESSURE: 70 MMHG | SYSTOLIC BLOOD PRESSURE: 130 MMHG | WEIGHT: 160 LBS | HEIGHT: 65 IN

## 2020-01-17 DIAGNOSIS — M54.9 DORSALGIA, UNSPECIFIED: ICD-10-CM

## 2020-01-17 DIAGNOSIS — G89.29 DORSALGIA, UNSPECIFIED: ICD-10-CM

## 2020-01-17 DIAGNOSIS — I10 ESSENTIAL (PRIMARY) HYPERTENSION: ICD-10-CM

## 2020-01-17 PROCEDURE — G0463: CPT

## 2020-01-17 PROCEDURE — 99213 OFFICE O/P EST LOW 20 MIN: CPT | Mod: GE

## 2020-01-19 RX ORDER — METHYLPREDNISOLONE 4 MG/1
4 TABLET ORAL
Qty: 1 | Refills: 0 | Status: DISCONTINUED | COMMUNITY
Start: 2019-10-03 | End: 2020-01-17

## 2020-01-19 RX ORDER — CIPROFLOXACIN HYDROCHLORIDE 500 MG/1
500 TABLET, FILM COATED ORAL TWICE DAILY
Qty: 20 | Refills: 1 | Status: DISCONTINUED | COMMUNITY
Start: 2019-10-03 | End: 2020-01-17

## 2020-01-19 RX ORDER — MOMETASONE FUROATE 1 MG/ML
0.1 SOLUTION TOPICAL
Qty: 1 | Refills: 5 | Status: DISCONTINUED | COMMUNITY
Start: 2019-10-23 | End: 2020-01-17

## 2020-01-21 NOTE — PHYSICAL EXAM
[No JVD] : no jugular venous distention [Supple] : supple [Pedal Pulses Present] : the pedal pulses are present [No Edema] : there was no peripheral edema [No Extremity Clubbing/Cyanosis] : no extremity clubbing/cyanosis [Normal] : no rash [Coordination Grossly Intact] : coordination grossly intact [de-identified] : Appearing distressed by pain [de-identified] : Non-tender to palpation in paraspinal muscles but feels very tight [de-identified] : L knee: anterior swelling as well as palpable mass in lower popliteal region, both non-tender to palpation [de-identified] : strength and ROM testing of LEs limited by pain but able to stand by herself and ambulate slowly

## 2020-01-21 NOTE — ASSESSMENT
[FreeTextEntry1] : 44 yo F w/ hx of sciatica and headaches presenting with severe back pain.\par \par #Back pain/tightness, chronic and worsening\par -hx of sciatica with remote childhood injury\par -no previous imaging in chart and pt does not recall having previous imaging done\par -MRI lumbrosacral w/o con ordered\par -Cyclobenzaprine 5mg TID PRN for 2 weeks, advised that she can increase to 10mg TID if lower dose not helping\par \par #L knee pain, chronic and intermittent\par -possibly 2/2 cyst vs osteoarthritis\par -palpable elevation in L anterior area of knee that intermittently enlarges as per pt\par -palpable mass in L lower popliteal area, possibly 2/2 baker's cyst\par -Xray of L knee ordered\par -advised she can take Tylenol and NSAIDs PRN for the pain, but Tylenol preferred since she reports having sensitive stomach\par \par #HCM\par -Flu vaccine: received in Oct 2019 at [facility]\par -PHQ-9 score of 6 on 9/30/2019, notes stress with working two jobs and being a single mother in setting of chronic pain\par -Cervical cancer screening: last HPV co-testing on 10/13/15, next in Oct 2020, follows with GYN\par \par RTC in 5-10 weeks for follow-up\par d/w Dr. Hernandez

## 2020-01-21 NOTE — REVIEW OF SYSTEMS
[Joint Pain] : joint pain [Joint Swelling] : joint swelling [Back Pain] : back pain [Negative] : Neurological [Chills] : no chills [Fever] : no fever [Discharge] : no discharge [Recent Change In Weight] : ~T no recent weight change [Vision Problems] : no vision problems [Pain] : no pain [Earache] : no earache [Itching] : no itching [Hearing Loss] : no hearing loss [Chest Pain] : no chest pain [Palpitations] : no palpitations [Lower Ext Edema] : no lower extremity edema [Shortness Of Breath] : no shortness of breath [Wheezing] : no wheezing [Nausea] : no nausea [Cough] : no cough [Abdominal Pain] : no abdominal pain [Diarrhea] : diarrhea [Constipation] : no constipation [Melena] : no melena [Vomiting] : no vomiting [Incontinence] : no incontinence [Hematuria] : no hematuria [Dysuria] : no dysuria [Skin Rash] : no skin rash [Dizziness] : no dizziness [Headache] : no headache [Memory Loss] : no memory loss [Fainting] : no fainting [de-identified] : Walking difficulties from back pain [FreeTextEntry9] : Back pain from tightness, intermittent joint swelling and pain in L knee

## 2020-01-21 NOTE — HISTORY OF PRESENT ILLNESS
[FreeTextEntry8] : 44 yo F w/ hx of sciatica and headaches presenting with severe back pain. Last seen in Sept 2019 for CPE. She reports that at baseline, she suffers from the back pain/tightness every day and is usually careful in her maneuvers in a way to not aggravate the pain. However, 3 days ago, she suddenly started feeling worsening back pain, noting that it became very tight and tense from lower back to mid-upper back. The following morning, it became so severe that she could not stand up anymore and was in 10/10 pain. She was unable to get out of bed and had to crawl out of it. Standing up and movements of her body, particularly in certain positions make the pain worse. She notes that the pain usually goes away with rest and it is more often on the left side, but this time, it's both sides and also associated with sharp shooting pain to the knees, sometimes further down the leg. She tried alternating Tylenol and Motrin every 6 hours yesterday, massaging it, using heat patches, and icing it, all of which did not help much. She has not taken any other meds for this worsened pain since she has a sensitive stomach and does not want anything addictive. Tried PT and stretching in the past, but states that it did not much help either and sometimes made it worse. Lying down in a comfortable position helped a little but the relief doesn't last long. She notes a remote hx of falling off a horse in childhood with possible disc herniation and also persistent pain after an epidural placement at the time of giving birth to her son. She also reports being on her feet a lot for her two jobs. Additionally notes intermittent L knee pain with an anterior bump that increases in size from time to time and an cyst behind the knee that is unchanged in size but feels tight in certain positions. Today, her back feels a little better, rates the severity 8/10 currently. Denies f/c/n/v, CP, SOB, abdominal pain, diarrhea, constipation, dysuria, hematuria, hematochezia, profound motor weakness, loss of sensation in legs, saddle anesthesia, urine or stool incontinence, recent trauma.

## 2020-01-22 DIAGNOSIS — M54.9 DORSALGIA, UNSPECIFIED: ICD-10-CM

## 2020-01-22 DIAGNOSIS — M25.562 PAIN IN LEFT KNEE: ICD-10-CM

## 2020-03-03 ENCOUNTER — FORM ENCOUNTER (OUTPATIENT)
Age: 45
End: 2020-03-03

## 2020-03-03 DIAGNOSIS — R92.8 OTHER ABNORMAL AND INCONCLUSIVE FINDINGS ON DIAGNOSTIC IMAGING OF BREAST: ICD-10-CM

## 2020-03-04 ENCOUNTER — OUTPATIENT (OUTPATIENT)
Dept: OUTPATIENT SERVICES | Facility: HOSPITAL | Age: 45
LOS: 1 days | End: 2020-03-04
Payer: MEDICAID

## 2020-03-04 ENCOUNTER — APPOINTMENT (OUTPATIENT)
Dept: MAMMOGRAPHY | Facility: IMAGING CENTER | Age: 45
End: 2020-03-04
Payer: MEDICAID

## 2020-03-04 DIAGNOSIS — Z00.00 ENCOUNTER FOR GENERAL ADULT MEDICAL EXAMINATION WITHOUT ABNORMAL FINDINGS: ICD-10-CM

## 2020-03-04 PROCEDURE — 77067 SCR MAMMO BI INCL CAD: CPT | Mod: 26

## 2020-03-04 PROCEDURE — 77067 SCR MAMMO BI INCL CAD: CPT

## 2020-03-04 PROCEDURE — 77063 BREAST TOMOSYNTHESIS BI: CPT

## 2020-03-04 PROCEDURE — 77063 BREAST TOMOSYNTHESIS BI: CPT | Mod: 26

## 2020-03-06 PROBLEM — R92.8 ABNORMAL MAMMOGRAM: Status: ACTIVE | Noted: 2020-03-06

## 2020-03-07 ENCOUNTER — APPOINTMENT (OUTPATIENT)
Dept: MRI IMAGING | Facility: CLINIC | Age: 45
End: 2020-03-07

## 2020-03-07 ENCOUNTER — APPOINTMENT (OUTPATIENT)
Dept: RADIOLOGY | Facility: CLINIC | Age: 45
End: 2020-03-07

## 2020-03-09 ENCOUNTER — FORM ENCOUNTER (OUTPATIENT)
Age: 45
End: 2020-03-09

## 2020-03-10 ENCOUNTER — OUTPATIENT (OUTPATIENT)
Dept: OUTPATIENT SERVICES | Facility: HOSPITAL | Age: 45
LOS: 1 days | End: 2020-03-10
Payer: MEDICAID

## 2020-03-10 ENCOUNTER — APPOINTMENT (OUTPATIENT)
Dept: ULTRASOUND IMAGING | Facility: IMAGING CENTER | Age: 45
End: 2020-03-10
Payer: MEDICAID

## 2020-03-10 DIAGNOSIS — Z00.8 ENCOUNTER FOR OTHER GENERAL EXAMINATION: ICD-10-CM

## 2020-03-10 PROCEDURE — 76641 ULTRASOUND BREAST COMPLETE: CPT

## 2020-03-10 PROCEDURE — 76641 ULTRASOUND BREAST COMPLETE: CPT | Mod: 26,50

## 2020-06-25 ENCOUNTER — APPOINTMENT (OUTPATIENT)
Dept: OPHTHALMOLOGY | Facility: CLINIC | Age: 45
End: 2020-06-25

## 2020-12-15 PROBLEM — Z01.419 ENCOUNTER FOR GYNECOLOGICAL EXAMINATION: Status: RESOLVED | Noted: 2017-01-04 | Resolved: 2020-12-15

## 2021-08-04 ENCOUNTER — LABORATORY RESULT (OUTPATIENT)
Age: 46
End: 2021-08-04

## 2021-08-04 ENCOUNTER — APPOINTMENT (OUTPATIENT)
Dept: INTERNAL MEDICINE | Facility: CLINIC | Age: 46
End: 2021-08-04
Payer: MEDICAID

## 2021-08-04 ENCOUNTER — OUTPATIENT (OUTPATIENT)
Dept: OUTPATIENT SERVICES | Facility: HOSPITAL | Age: 46
LOS: 1 days | End: 2021-08-04
Payer: MEDICAID

## 2021-08-04 VITALS
WEIGHT: 160 LBS | OXYGEN SATURATION: 98 % | BODY MASS INDEX: 26.66 KG/M2 | SYSTOLIC BLOOD PRESSURE: 112 MMHG | HEART RATE: 81 BPM | DIASTOLIC BLOOD PRESSURE: 70 MMHG | HEIGHT: 65 IN

## 2021-08-04 DIAGNOSIS — I10 ESSENTIAL (PRIMARY) HYPERTENSION: ICD-10-CM

## 2021-08-04 PROCEDURE — 99213 OFFICE O/P EST LOW 20 MIN: CPT | Mod: GC

## 2021-08-04 PROCEDURE — 99396 PREV VISIT EST AGE 40-64: CPT | Mod: GC

## 2021-08-04 RX ORDER — CYCLOBENZAPRINE HYDROCHLORIDE 5 MG/1
5 TABLET, FILM COATED ORAL 3 TIMES DAILY
Qty: 42 | Refills: 0 | Status: DISCONTINUED | COMMUNITY
Start: 2020-01-17 | End: 2021-08-04

## 2021-08-04 NOTE — PHYSICAL EXAM
[No Rash] : no rash [Normal] : affect was normal and insight and judgment were intact [Urethral Meatus] : the meatus of the urethra showed no abnormalities [External Female Genitalia] : normal external genitalia [Vagina] : normal vaginal exam [Cervix] : normal cervix [de-identified] : prominent varicose veins on L foot and ankle

## 2021-08-04 NOTE — HEALTH RISK ASSESSMENT
[Very Good] : ~his/her~  mood as very good [0-4] : 0-4 [No] : No [No falls in past year] : Patient reported no falls in the past year [0] : 2) Feeling down, depressed, or hopeless: Not at all (0) [PHQ-2 Negative - No further assessment needed] : PHQ-2 Negative - No further assessment needed [Patient reported mammogram was normal] : Patient reported mammogram was normal [Patient reported PAP Smear was normal] : Patient reported PAP Smear was normal [With Family] : lives with family [# of Members in Household ___] :  household currently consist of [unfilled] member(s) [Employed] : employed [FreeTextEntry1] : knee pain  [] : No [de-identified] : Walks daily  [de-identified] : healthy, a lot of vegetables and salad  [[id number]] : 0 [Change in mental status noted] : No change in mental status noted [Language] : denies difficulty with language [Behavior] : denies difficulty with behavior [Learning/Retaining New Information] : denies difficulty learning/retaining new information [Handling Complex Tasks] : denies difficulty handling complex tasks [Reasoning] : denies difficulty with reasoning [Spatial Ability and Orientation] : denies difficulty with spatial ability and orientation [Sexually Active] : not sexually active [High Risk Behavior] : no high risk behavior [MammogramDate] : 03/2020 [PapSmearDate] : 01/2015 [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

## 2021-08-04 NOTE — ASSESSMENT
[FreeTextEntry1] : 46 year old female, PMH sciatica, pre-diabetes, presenting for CPE. \par \par #Knee pain \par -likely arthritis given "grinding" noise when walks up stairs \par -deferred PT at this time, wants to see what xrays show \par -FU In clinic following xrays, may need PT vs ortho referral \par -tylenol PRN for pain \par \par #CPE\par -CBC, CMP, A1C, Lipids, STD testing \par -benefits of continued exercise discussed, as well as adding aerobic and weight bearing exercises \par -mammogram referral given\par -pap performed today \par -had covid vaccine \par -discuss colonoscopy/ FIT test at next visit. \par \par Return to clinic in 5-10 weeks following xrays for further management of knee pain.

## 2021-08-04 NOTE — HISTORY OF PRESENT ILLNESS
[de-identified] : Ms. Cowan is a 46 year old female, PMH sciatica, pre-diabetes, presenting for CPE. She has been doing well over the past year. 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 Feels has mood has been good, she focuses on doing things that make her happy such as walking outside. [place]. Cooks most of her own meals, eats of a lot of vegetables and salads. \par  Her main concern at this time is her knee pain. She has been experiencing bilateral knee pain for over a year, worsened with walking up stairs. Sometimes hears her knees clicking/ grinding, and occasionally feel as if they are going to give out. They also swell. She was given an xray referral last time she was here but was unable to go due to covid. The pain is relieved with tylenol. \par \par HCM\par -last mammogram March 2020, reccomended repeat in 1 year \par -last pap/ costesting 2015, normal\par COVID vaccines- Moderna March and April 2021 \par PHQ2= 0. Attends an online support group because her son has anxiety, finds it very helpful.

## 2021-08-04 NOTE — REVIEW OF SYSTEMS
[Joint Pain] : joint pain [Joint Swelling] : joint swelling [Back Pain] : back pain [Negative] : Heme/Lymph [FreeTextEntry9] : knee and back pain

## 2021-08-05 LAB
A1C WITH ESTIMATED AVERAGE GLUCOSE RESULT: 5.9 % — HIGH (ref 4–5.6)
ALBUMIN SERPL ELPH-MCNC: 4.4 G/DL — SIGNIFICANT CHANGE UP (ref 3.3–5)
ALP SERPL-CCNC: 60 U/L — SIGNIFICANT CHANGE UP (ref 40–120)
ALT FLD-CCNC: 27 U/L — SIGNIFICANT CHANGE UP (ref 10–45)
ANION GAP SERPL CALC-SCNC: 13 MMOL/L — SIGNIFICANT CHANGE UP (ref 5–17)
AST SERPL-CCNC: 14 U/L — SIGNIFICANT CHANGE UP (ref 10–40)
BILIRUB SERPL-MCNC: 0.6 MG/DL — SIGNIFICANT CHANGE UP (ref 0.2–1.2)
BUN SERPL-MCNC: 9 MG/DL — SIGNIFICANT CHANGE UP (ref 7–23)
CALCIUM SERPL-MCNC: 9.9 MG/DL — SIGNIFICANT CHANGE UP (ref 8.4–10.5)
CHLORIDE SERPL-SCNC: 104 MMOL/L — SIGNIFICANT CHANGE UP (ref 96–108)
CHOLEST SERPL-MCNC: 171 MG/DL — SIGNIFICANT CHANGE UP
CO2 SERPL-SCNC: 22 MMOL/L — SIGNIFICANT CHANGE UP (ref 22–31)
CREAT SERPL-MCNC: 0.7 MG/DL — SIGNIFICANT CHANGE UP (ref 0.5–1.3)
ESTIMATED AVERAGE GLUCOSE: 123 MG/DL — HIGH (ref 68–114)
GLUCOSE SERPL-MCNC: 104 MG/DL — HIGH (ref 70–99)
HCT VFR BLD CALC: 44 % — SIGNIFICANT CHANGE UP (ref 34.5–45)
HDLC SERPL-MCNC: 39 MG/DL — LOW
HGB BLD-MCNC: 13.3 G/DL — SIGNIFICANT CHANGE UP (ref 11.5–15.5)
LIPID PNL WITH DIRECT LDL SERPL: 103 MG/DL — HIGH
MCHC RBC-ENTMCNC: 30.2 GM/DL — LOW (ref 32–36)
MCHC RBC-ENTMCNC: 30.6 PG — SIGNIFICANT CHANGE UP (ref 27–34)
MCV RBC AUTO: 101.1 FL — HIGH (ref 80–100)
NON HDL CHOLESTEROL: 131 MG/DL — HIGH
PLATELET # BLD AUTO: 372 K/UL — SIGNIFICANT CHANGE UP (ref 150–400)
POTASSIUM SERPL-MCNC: 4.9 MMOL/L — SIGNIFICANT CHANGE UP (ref 3.5–5.3)
POTASSIUM SERPL-SCNC: 4.9 MMOL/L — SIGNIFICANT CHANGE UP (ref 3.5–5.3)
PROT SERPL-MCNC: 7.4 G/DL — SIGNIFICANT CHANGE UP (ref 6–8.3)
RBC # BLD: 4.35 M/UL — SIGNIFICANT CHANGE UP (ref 3.8–5.2)
RBC # FLD: 13.2 % — SIGNIFICANT CHANGE UP (ref 10.3–14.5)
SODIUM SERPL-SCNC: 139 MMOL/L — SIGNIFICANT CHANGE UP (ref 135–145)
TRIGL SERPL-MCNC: 142 MG/DL — SIGNIFICANT CHANGE UP
WBC # BLD: 12.98 K/UL — HIGH (ref 3.8–10.5)
WBC # FLD AUTO: 12.98 K/UL — HIGH (ref 3.8–10.5)

## 2021-08-06 DIAGNOSIS — M25.562 PAIN IN LEFT KNEE: ICD-10-CM

## 2021-08-06 DIAGNOSIS — Z00.00 ENCOUNTER FOR GENERAL ADULT MEDICAL EXAMINATION WITHOUT ABNORMAL FINDINGS: ICD-10-CM

## 2021-08-06 LAB
C TRACH RRNA SPEC QL NAA+PROBE: NOT DETECTED
HIV1+2 AB SPEC QL IA.RAPID: NONREACTIVE
HPV HIGH+LOW RISK DNA PNL CVX: NOT DETECTED
N GONORRHOEA RRNA SPEC QL NAA+PROBE: NOT DETECTED
SOURCE AMPLIFICATION: NORMAL

## 2021-08-10 ENCOUNTER — LABORATORY RESULT (OUTPATIENT)
Age: 46
End: 2021-08-10

## 2021-08-10 LAB
HCT VFR BLD CALC: 41.1 % — SIGNIFICANT CHANGE UP (ref 34.5–45)
HGB BLD-MCNC: 13.3 G/DL — SIGNIFICANT CHANGE UP (ref 11.5–15.5)
MCHC RBC-ENTMCNC: 31.4 PG — SIGNIFICANT CHANGE UP (ref 27–34)
MCHC RBC-ENTMCNC: 32.4 GM/DL — SIGNIFICANT CHANGE UP (ref 32–36)
MCV RBC AUTO: 96.9 FL — SIGNIFICANT CHANGE UP (ref 80–100)
PLATELET # BLD AUTO: 415 K/UL — HIGH (ref 150–400)
RBC # BLD: 4.24 M/UL — SIGNIFICANT CHANGE UP (ref 3.8–5.2)
RBC # FLD: 12.2 % — SIGNIFICANT CHANGE UP (ref 10.3–14.5)
WBC # BLD: 8.96 K/UL — SIGNIFICANT CHANGE UP (ref 3.8–10.5)
WBC # FLD AUTO: 8.96 K/UL — SIGNIFICANT CHANGE UP (ref 3.8–10.5)

## 2021-08-10 PROCEDURE — 80061 LIPID PANEL: CPT

## 2021-08-10 PROCEDURE — G0463: CPT

## 2021-08-10 PROCEDURE — 85025 COMPLETE CBC W/AUTO DIFF WBC: CPT

## 2021-08-10 PROCEDURE — 85027 COMPLETE CBC AUTOMATED: CPT

## 2021-08-10 PROCEDURE — 80053 COMPREHEN METABOLIC PANEL: CPT

## 2021-08-10 PROCEDURE — 83036 HEMOGLOBIN GLYCOSYLATED A1C: CPT

## 2021-08-11 LAB
BASOPHILS # BLD AUTO: 0.08 K/UL — SIGNIFICANT CHANGE UP (ref 0–0.2)
BASOPHILS NFR BLD AUTO: 0.9 % — SIGNIFICANT CHANGE UP (ref 0–2)
EOSINOPHIL # BLD AUTO: 0.23 K/UL — SIGNIFICANT CHANGE UP (ref 0–0.5)
EOSINOPHIL NFR BLD AUTO: 2.6 % — SIGNIFICANT CHANGE UP (ref 0–6)
IMM GRANULOCYTES NFR BLD AUTO: 0.4 % — SIGNIFICANT CHANGE UP (ref 0–1.5)
LYMPHOCYTES # BLD AUTO: 2.31 K/UL — SIGNIFICANT CHANGE UP (ref 1–3.3)
LYMPHOCYTES # BLD AUTO: 25.8 % — SIGNIFICANT CHANGE UP (ref 13–44)
MANUAL SMEAR VERIFICATION: SIGNIFICANT CHANGE UP
MONOCYTES # BLD AUTO: 0.76 K/UL — SIGNIFICANT CHANGE UP (ref 0–0.9)
MONOCYTES NFR BLD AUTO: 8.5 % — SIGNIFICANT CHANGE UP (ref 2–14)
NEUTROPHILS # BLD AUTO: 5.54 K/UL — SIGNIFICANT CHANGE UP (ref 1.8–7.4)
NEUTROPHILS NFR BLD AUTO: 61.8 % — SIGNIFICANT CHANGE UP (ref 43–77)
PLAT MORPH BLD: NORMAL — SIGNIFICANT CHANGE UP
RBC BLD AUTO: NORMAL — SIGNIFICANT CHANGE UP

## 2021-08-13 ENCOUNTER — OUTPATIENT (OUTPATIENT)
Dept: OUTPATIENT SERVICES | Facility: HOSPITAL | Age: 46
LOS: 1 days | End: 2021-08-13
Payer: MEDICAID

## 2021-08-13 ENCOUNTER — RESULT REVIEW (OUTPATIENT)
Age: 46
End: 2021-08-13

## 2021-08-13 ENCOUNTER — APPOINTMENT (OUTPATIENT)
Dept: MAMMOGRAPHY | Facility: IMAGING CENTER | Age: 46
End: 2021-08-13
Payer: MEDICAID

## 2021-08-13 ENCOUNTER — NON-APPOINTMENT (OUTPATIENT)
Age: 46
End: 2021-08-13

## 2021-08-13 ENCOUNTER — APPOINTMENT (OUTPATIENT)
Dept: RADIOLOGY | Facility: IMAGING CENTER | Age: 46
End: 2021-08-13
Payer: MEDICAID

## 2021-08-13 DIAGNOSIS — Z00.00 ENCOUNTER FOR GENERAL ADULT MEDICAL EXAMINATION WITHOUT ABNORMAL FINDINGS: ICD-10-CM

## 2021-08-13 DIAGNOSIS — M25.562 PAIN IN LEFT KNEE: ICD-10-CM

## 2021-08-13 DIAGNOSIS — Z00.8 ENCOUNTER FOR OTHER GENERAL EXAMINATION: ICD-10-CM

## 2021-08-13 PROCEDURE — 73564 X-RAY EXAM KNEE 4 OR MORE: CPT | Mod: 26,50

## 2021-08-13 PROCEDURE — 77063 BREAST TOMOSYNTHESIS BI: CPT

## 2021-08-13 PROCEDURE — 77067 SCR MAMMO BI INCL CAD: CPT | Mod: 26

## 2021-08-13 PROCEDURE — 77067 SCR MAMMO BI INCL CAD: CPT

## 2021-08-13 PROCEDURE — 77063 BREAST TOMOSYNTHESIS BI: CPT | Mod: 26

## 2021-08-13 PROCEDURE — 73564 X-RAY EXAM KNEE 4 OR MORE: CPT

## 2021-08-18 ENCOUNTER — OUTPATIENT (OUTPATIENT)
Dept: OUTPATIENT SERVICES | Facility: HOSPITAL | Age: 46
LOS: 1 days | End: 2021-08-18
Payer: MEDICAID

## 2021-08-18 ENCOUNTER — APPOINTMENT (OUTPATIENT)
Dept: INTERNAL MEDICINE | Facility: CLINIC | Age: 46
End: 2021-08-18

## 2021-08-18 DIAGNOSIS — M54.5 LOW BACK PAIN: ICD-10-CM

## 2021-08-18 DIAGNOSIS — G89.29 LOW BACK PAIN: ICD-10-CM

## 2021-08-18 DIAGNOSIS — I10 ESSENTIAL (PRIMARY) HYPERTENSION: ICD-10-CM

## 2021-08-18 DIAGNOSIS — M25.562 PAIN IN LEFT KNEE: ICD-10-CM

## 2021-08-18 DIAGNOSIS — G89.29 PAIN IN LEFT KNEE: ICD-10-CM

## 2021-08-18 LAB — CYTOLOGY CVX/VAG DOC THIN PREP: NORMAL

## 2021-08-18 PROCEDURE — G0463: CPT

## 2021-08-18 NOTE — HISTORY OF PRESENT ILLNESS
[Home] : at home, [unfilled] , at the time of the visit. [Medical Office: ([facility])___] : at the medical office located in  [Verbal consent obtained from patient] : the patient, [unfilled] [FreeTextEntry1] : Lower back pain  [de-identified] : Ms. Cowan is a 46 year old female, PMH sciatica and pre-diabetes presenting for low back pain. Patient has had sciatica for many years and is currently having an episode of low back pain that started two days ago, described as stabbing with radiation to hips and legs. She has had episodes like this in the past and sx typically lasting weeks with generally well-interval in between. Denies fever, chills, weight loss, trauma, falls, urinary/fecal incontinence, saddle anesthesia, difficulty walking. Able to ambulate and bear weight. She has tried PT, tylenol, and short courses of flexiiril in the past which has helped.

## 2021-08-18 NOTE — REVIEW OF SYSTEMS
[Fever] : no fever [Chills] : no chills [Recent Change In Weight] : ~T no recent weight change [Vision Problems] : no vision problems [Hearing Loss] : no hearing loss [Chest Pain] : no chest pain [Palpitations] : no palpitations [Lower Ext Edema] : no lower extremity edema [Shortness Of Breath] : no shortness of breath [Wheezing] : no wheezing [Cough] : no cough [Abdominal Pain] : no abdominal pain [Nausea] : no nausea [Constipation] : no constipation [Diarrhea] : diarrhea [Vomiting] : no vomiting [Dysuria] : no dysuria [Frequency] : no frequency [Headache] : no headache [Dizziness] : no dizziness [Unsteady Walking] : no ataxia

## 2021-08-18 NOTE — PHYSICAL EXAM
[No Acute Distress] : no acute distress [Well Developed] : well developed [Well-Appearing] : well-appearing [Normal Sclera/Conjunctiva] : normal sclera/conjunctiva [EOMI] : extraocular movements intact [Normal Affect] : the affect was normal [Alert and Oriented x3] : oriented to person, place, and time [de-identified] : Varicose veins on left lower extremity and ankles

## 2021-08-18 NOTE — ASSESSMENT
[FreeTextEntry1] : # HCM\par - Reviewed pap smear results (Aug 2021) with patient. Results normal, repeat test in 5 years

## 2021-08-23 DIAGNOSIS — M25.562 PAIN IN LEFT KNEE: ICD-10-CM

## 2021-08-23 DIAGNOSIS — M54.5 LOW BACK PAIN: ICD-10-CM

## 2021-09-03 ENCOUNTER — TRANSCRIPTION ENCOUNTER (OUTPATIENT)
Age: 46
End: 2021-09-03

## 2022-07-13 ENCOUNTER — NON-APPOINTMENT (OUTPATIENT)
Age: 47
End: 2022-07-13

## 2022-07-13 ENCOUNTER — APPOINTMENT (OUTPATIENT)
Dept: OPHTHALMOLOGY | Facility: CLINIC | Age: 47
End: 2022-07-13

## 2022-07-13 PROCEDURE — 92004 COMPRE OPH EXAM NEW PT 1/>: CPT

## 2022-08-19 ENCOUNTER — APPOINTMENT (OUTPATIENT)
Dept: INTERNAL MEDICINE | Facility: CLINIC | Age: 47
End: 2022-08-19

## 2022-08-19 ENCOUNTER — OUTPATIENT (OUTPATIENT)
Dept: OUTPATIENT SERVICES | Facility: HOSPITAL | Age: 47
LOS: 1 days | End: 2022-08-19
Payer: MEDICAID

## 2022-08-19 VITALS
HEART RATE: 94 BPM | HEIGHT: 65 IN | WEIGHT: 157 LBS | SYSTOLIC BLOOD PRESSURE: 122 MMHG | OXYGEN SATURATION: 98 % | BODY MASS INDEX: 26.16 KG/M2 | DIASTOLIC BLOOD PRESSURE: 68 MMHG

## 2022-08-19 DIAGNOSIS — I10 ESSENTIAL (PRIMARY) HYPERTENSION: ICD-10-CM

## 2022-08-19 DIAGNOSIS — B36.0 PITYRIASIS VERSICOLOR: ICD-10-CM

## 2022-08-19 DIAGNOSIS — K82.4 CHOLESTEROLOSIS OF GALLBLADDER: ICD-10-CM

## 2022-08-19 LAB
ALBUMIN SERPL ELPH-MCNC: 4.2 G/DL
ALP BLD-CCNC: 62 U/L
ALT SERPL-CCNC: 23 U/L
ANION GAP SERPL CALC-SCNC: 11 MMOL/L
AST SERPL-CCNC: 17 U/L
BASOPHILS # BLD AUTO: 0.06 K/UL
BASOPHILS NFR BLD AUTO: 0.9 %
BILIRUB SERPL-MCNC: 0.2 MG/DL
BUN SERPL-MCNC: 9 MG/DL
CALCIUM SERPL-MCNC: 9.9 MG/DL
CHLORIDE SERPL-SCNC: 104 MMOL/L
CHOLEST SERPL-MCNC: 199 MG/DL
CO2 SERPL-SCNC: 26 MMOL/L
CREAT SERPL-MCNC: 0.65 MG/DL
EGFR: 109 ML/MIN/1.73M2
EOSINOPHIL # BLD AUTO: 0.26 K/UL
EOSINOPHIL NFR BLD AUTO: 3.8 %
ESTIMATED AVERAGE GLUCOSE: 128 MG/DL
GLUCOSE SERPL-MCNC: 109 MG/DL
HBA1C MFR BLD HPLC: 6.1 %
HCT VFR BLD CALC: 39.3 %
HDLC SERPL-MCNC: 43 MG/DL
HGB BLD-MCNC: 13.3 G/DL
IMM GRANULOCYTES NFR BLD AUTO: 0.3 %
LDLC SERPL CALC-MCNC: 88 MG/DL
LYMPHOCYTES # BLD AUTO: 1.67 K/UL
LYMPHOCYTES NFR BLD AUTO: 24.2 %
MAN DIFF?: NORMAL
MCHC RBC-ENTMCNC: 31.6 PG
MCHC RBC-ENTMCNC: 33.8 GM/DL
MCV RBC AUTO: 93.3 FL
MONOCYTES # BLD AUTO: 0.61 K/UL
MONOCYTES NFR BLD AUTO: 8.8 %
NEUTROPHILS # BLD AUTO: 4.29 K/UL
NEUTROPHILS NFR BLD AUTO: 62 %
NONHDLC SERPL-MCNC: 156 MG/DL
PLATELET # BLD AUTO: 360 K/UL
POTASSIUM SERPL-SCNC: 4.7 MMOL/L
PROT SERPL-MCNC: 7.1 G/DL
RBC # BLD: 4.21 M/UL
RBC # FLD: 12.1 %
SODIUM SERPL-SCNC: 140 MMOL/L
TRIGL SERPL-MCNC: 340 MG/DL
WBC # FLD AUTO: 6.91 K/UL

## 2022-08-19 PROCEDURE — 80061 LIPID PANEL: CPT

## 2022-08-19 PROCEDURE — G0463: CPT

## 2022-08-19 PROCEDURE — 83036 HEMOGLOBIN GLYCOSYLATED A1C: CPT

## 2022-08-19 PROCEDURE — 99396 PREV VISIT EST AGE 40-64: CPT | Mod: GE

## 2022-08-19 PROCEDURE — 80053 COMPREHEN METABOLIC PANEL: CPT

## 2022-08-19 PROCEDURE — 85025 COMPLETE CBC W/AUTO DIFF WBC: CPT

## 2022-08-19 RX ORDER — CYCLOBENZAPRINE HYDROCHLORIDE 5 MG/1
5 TABLET, FILM COATED ORAL
Qty: 10 | Refills: 0 | Status: DISCONTINUED | COMMUNITY
Start: 2021-08-18 | End: 2022-08-19

## 2022-08-19 RX ORDER — KETOCONAZOLE 20 MG/G
2 CREAM TOPICAL DAILY
Qty: 1 | Refills: 0 | Status: ACTIVE | COMMUNITY
Start: 2022-08-19 | End: 1900-01-01

## 2022-08-19 RX ORDER — FLUTICASONE PROPIONATE 50 UG/1
50 SPRAY, METERED NASAL DAILY
Qty: 1 | Refills: 0 | Status: ACTIVE | COMMUNITY
Start: 2022-08-19 | End: 1900-01-01

## 2022-08-19 NOTE — REVIEW OF SYSTEMS
[Mole Changes] : mole changes [Skin Rash] : skin rash [Negative] : Psychiatric [Earache] : no earache [Hearing Loss] : no hearing loss [Nosebleed] : no nosebleeds [Hoarseness] : no hoarseness [Nasal Discharge] : no nasal discharge [Sore Throat] : no sore throat [Postnasal Drip] : no postnasal drip [Joint Pain] : no joint pain [Joint Stiffness] : no joint stiffness [Joint Swelling] : no joint swelling [Muscle Weakness] : no muscle weakness [Muscle Pain] : no muscle pain [Back Pain] : no back pain [Itching] : no itching [Nail Changes] : no nail changes [Hair Changes] : no hair changes [FreeTextEntry4] : + ear fullness

## 2022-08-19 NOTE — ASSESSMENT
[FreeTextEntry1] : 47 year old female, PMH sciatica and pre-diabetes presenting for CPE. Pt with left ear fullness without wax most c/w eustachian tube dysfunction. Also with hypopigmented lesions on arms most c/w tenia versicolor and subcutaneous fatty appearing cysts on arms and left thigh most c/w lipomas.

## 2022-08-19 NOTE — PLAN
[FreeTextEntry1] : Ear fullness\par -Flonase and OTC pseudophed for treatment of possible eustachian tube dysfunction\par -ENT referral\par -RTC in 5-10 weeks\par \par Tenia versicolor\par -Ketoconazole ointment\par -RTC in 5-10 weeks\par \par Gallbladder polyp\par -Repeat RUQ US\par -GI referral \par \par pre-DM\par -A1C\par -Discussed proper lifestyle modifications/diet and benefits of exercise\par \par HCM\par -PHQ-2 sore 0 today\par -CBC, BMP, A1C, lipid profile\par -Mammogram referral placed\par -CRC screening referral placed\par -RTC in 5-10 weeks

## 2022-08-19 NOTE — HEALTH RISK ASSESSMENT
[Never] : Never [No] : No [Monthly or less (1 pt)] : Monthly or less (1 point) [Never (0 pts)] : Never (0 points) [0] : 2) Feeling down, depressed, or hopeless: Not at all (0) [None] : None [# of Members in Household ___] :  household currently consist of [unfilled] member(s) [Employed] : employed [[id number]] : 2 [Reports changes in vision] : Reports no changes in vision [Reports changes in dental health] : Reports no changes in dental health [de-identified] : With son

## 2022-08-19 NOTE — HISTORY OF PRESENT ILLNESS
[FreeTextEntry1] : CPE [de-identified] : 47 year old female, PMH sciatica and pre-diabetes presenting for CPE. Doing overall well, same little issues. Nothing too concerning. In 2019 she had feelings of something moving in her ears, she was constantly touching it and ended up in the hospital with an infection. She followed with an ENT. That feeling never went away fully. \par \par Pt also notes spots on her arms that are white that are not itchy or painful, just there. She also notes little balls underneath the skin on both thighs that are painful and bothersome. She has little ones on the same spots on both arms which have been growing. \par \par Sciatica well controlled with exercises she learned at PT. Has not needed to use muscle relaxants lately. \par \par Otherwise feels well. Recently saw eye doctor. Everything going well at home. Lives at home with son. Son going to school. Pt works at a school, starting again next week. No alcohol use, no tobacco use. \par \par LMP 7/21 mostly normal menstrual cycles\par

## 2022-08-19 NOTE — PHYSICAL EXAM
[Normal Outer Ear/Nose] : the outer ears and nose were normal in appearance [Normal Oropharynx] : the oropharynx was normal [No Abdominal Bruit] : a ~M bruit was not heard ~T in the abdomen [No Varicosities] : no varicosities [Pedal Pulses Present] : the pedal pulses are present [No Edema] : there was no peripheral edema [No Palpable Aorta] : no palpable aorta [Normal] : affect was normal and insight and judgment were intact [de-identified] : Slight fullness behind left TM without erythema. No wax b/l ears.  [de-identified] : + small, hypopigmented areas  b/l arms. Left thigh and b/l arms with subcutaneous soft fatty-density cysts.

## 2022-08-29 DIAGNOSIS — Z00.00 ENCOUNTER FOR GENERAL ADULT MEDICAL EXAMINATION WITHOUT ABNORMAL FINDINGS: ICD-10-CM

## 2023-03-17 ENCOUNTER — RESULT REVIEW (OUTPATIENT)
Age: 48
End: 2023-03-17

## 2023-03-17 ENCOUNTER — OUTPATIENT (OUTPATIENT)
Dept: OUTPATIENT SERVICES | Facility: HOSPITAL | Age: 48
LOS: 1 days | End: 2023-03-17
Payer: MEDICAID

## 2023-03-17 ENCOUNTER — APPOINTMENT (OUTPATIENT)
Dept: ULTRASOUND IMAGING | Facility: IMAGING CENTER | Age: 48
End: 2023-03-17
Payer: MEDICAID

## 2023-03-17 ENCOUNTER — APPOINTMENT (OUTPATIENT)
Dept: MAMMOGRAPHY | Facility: IMAGING CENTER | Age: 48
End: 2023-03-17
Payer: MEDICAID

## 2023-03-17 DIAGNOSIS — K82.4 CHOLESTEROLOSIS OF GALLBLADDER: ICD-10-CM

## 2023-03-17 DIAGNOSIS — Z00.8 ENCOUNTER FOR OTHER GENERAL EXAMINATION: ICD-10-CM

## 2023-03-17 DIAGNOSIS — Z00.00 ENCOUNTER FOR GENERAL ADULT MEDICAL EXAMINATION WITHOUT ABNORMAL FINDINGS: ICD-10-CM

## 2023-03-17 PROCEDURE — 77063 BREAST TOMOSYNTHESIS BI: CPT

## 2023-03-17 PROCEDURE — 76705 ECHO EXAM OF ABDOMEN: CPT

## 2023-03-17 PROCEDURE — 77063 BREAST TOMOSYNTHESIS BI: CPT | Mod: 26

## 2023-03-17 PROCEDURE — 76705 ECHO EXAM OF ABDOMEN: CPT | Mod: 26

## 2023-03-17 PROCEDURE — 77067 SCR MAMMO BI INCL CAD: CPT | Mod: 26

## 2023-03-17 PROCEDURE — 77067 SCR MAMMO BI INCL CAD: CPT

## 2023-04-17 ENCOUNTER — NON-APPOINTMENT (OUTPATIENT)
Age: 48
End: 2023-04-17

## 2023-08-22 ENCOUNTER — MED ADMIN CHARGE (OUTPATIENT)
Age: 48
End: 2023-08-22

## 2023-08-22 ENCOUNTER — OUTPATIENT (OUTPATIENT)
Dept: OUTPATIENT SERVICES | Facility: HOSPITAL | Age: 48
LOS: 1 days | End: 2023-08-22

## 2023-08-22 ENCOUNTER — APPOINTMENT (OUTPATIENT)
Age: 48
End: 2023-08-22
Payer: MEDICAID

## 2023-08-22 VITALS
WEIGHT: 160 LBS | DIASTOLIC BLOOD PRESSURE: 76 MMHG | HEART RATE: 91 BPM | BODY MASS INDEX: 26.66 KG/M2 | HEIGHT: 65 IN | SYSTOLIC BLOOD PRESSURE: 112 MMHG | OXYGEN SATURATION: 98 %

## 2023-08-22 DIAGNOSIS — H92.03 OTALGIA, BILATERAL: ICD-10-CM

## 2023-08-22 DIAGNOSIS — Z00.00 ENCOUNTER FOR GENERAL ADULT MEDICAL EXAMINATION W/OUT ABNORMAL FINDINGS: ICD-10-CM

## 2023-08-22 DIAGNOSIS — R73.03 PREDIABETES.: ICD-10-CM

## 2023-08-22 DIAGNOSIS — I83.90 ASYMPTOMATIC VARICOSE VEINS OF UNSPECIFIED LOWER EXTREMITY: ICD-10-CM

## 2023-08-22 DIAGNOSIS — Z13.31 ENCOUNTER FOR SCREENING FOR DEPRESSION: ICD-10-CM

## 2023-08-22 DIAGNOSIS — M54.9 DORSALGIA, UNSPECIFIED: ICD-10-CM

## 2023-08-22 DIAGNOSIS — Z23 ENCOUNTER FOR IMMUNIZATION: ICD-10-CM

## 2023-08-22 DIAGNOSIS — Z22.7 LATENT TUBERCULOSIS: ICD-10-CM

## 2023-08-22 DIAGNOSIS — A04.8 OTHER SPECIFIED BACTERIAL INTESTINAL INFECTIONS: ICD-10-CM

## 2023-08-22 DIAGNOSIS — I10 ESSENTIAL (PRIMARY) HYPERTENSION: ICD-10-CM

## 2023-08-22 PROCEDURE — G0444 DEPRESSION SCREEN ANNUAL: CPT | Mod: 59

## 2023-08-22 PROCEDURE — 99214 OFFICE O/P EST MOD 30 MIN: CPT | Mod: 25

## 2023-08-22 PROCEDURE — 99396 PREV VISIT EST AGE 40-64: CPT | Mod: 25

## 2023-08-22 NOTE — ASSESSMENT
[FreeTextEntry1] : 48 year old female, PMH sciatica and pre-diabetes presenting for CPE.   # HM -COVID:initial 2 shots and 1 booster, got COVID in 2021 and fully recovered after home quaranteen -Flu:10/1/22 utd -Tdap: 10/3/12, offered Tdap today as update. Pt amenable. -Retinal exam yearly: wearing eye glasses and denied retinopathy -mammo:3/17/23  Birads 2, will call next year for Rx.  -pap smear: abnormal PAP+, s/p LEEP surgery 7/2023, f/up GYN -colonoscopy: Made referral for GI for the first time of surveillance.   # Eustachian tube dysfunction c/o chronic occasional left ear fullness. no ear wax.   # GERD, hx of H. pylori 2 times treated with ABT by GI and confirmed eradication of H. Pylori after tx.  Bloating, epigastric discomfort depending on food.  Will see GI for surveillance colonoscopy.  Check stool test for H pylori antigen.  # mild Depression PHQ9=11, Denied SI/HI. Offered our  services but pt deferred at this time as she has good friends. She engaged in [employer] activities actively.    # latent TB Born and raised in [facility]  and moved to the  in [place].  TB test ppd postivie: chest xray in the past was always negative.  Chest xray: Hx latent TB, ppd test positive, s/p prophylatix TB med for 1 year  by [facility]  no current  TBsx, she wishes to check xray. Made Rx.   # lateral/medial Rt knee pain, knee crepitus  mother has Rheumatoid arthritis,  She noticed a protruding under the knee bone that growing bigger  since March 2023.  check Rt knee xray.  # s/p LEEP surgery 7/2023 for abnormal cells in PAP No heavy bleeding.  f/up GYN  # symptomatic varicose vein of left LE Lt>> Rt varicose vein , left ankle and left behind knee noted with promient discoloration, pain, heaviness.  Hx of stripping of varicose vein 22 years ago when she developed severe varicose vein after pregnancy. She will get varicose vein ablation procedure this afternoon. will get 6 times of ablation as a course.   -check lab as planned. -F/up in 1 year or prn.

## 2023-08-22 NOTE — HEALTH RISK ASSESSMENT
[Very Good] : ~his/her~  mood as very good [Yes] : Yes [Monthly or less (1 pt)] : Monthly or less (1 point) [1 or 2 (0 pts)] : 1 or 2 (0 points) [No falls in past year] : Patient reported no falls in the past year [0] : 2) Feeling down, depressed, or hopeless: Not at all (0) [PHQ-2 Negative - No further assessment needed] : PHQ-2 Negative - No further assessment needed [None] : None [# of Members in Household ___] :  household currently consist of [unfilled] member(s) [Employed] : employed [Fully functional (bathing, dressing, toileting, transferring, walking, feeding)] : Fully functional (bathing, dressing, toileting, transferring, walking, feeding) [Fully functional (using the telephone, shopping, preparing meals, housekeeping, doing laundry, using] : Fully functional and needs no help or supervision to perform IADLs (using the telephone, shopping, preparing meals, housekeeping, doing laundry, using transportation, managing medications and managing finances) [Smoke Detector] : smoke detector [Seat Belt] :  uses seat belt [With Patient/Caregiver] : , with patient/caregiver [No] : No [Never (0 pts)] : Never (0 points) [2] : 1) Little interest or pleasure doing things for more than half of the days (2) [1] : 2) Feeling down, depressed, or hopeless for several days (1) [PHQ-2 Positive] : PHQ-2 Positive [Not at All (0)] : 6.) Feeling bad about yourself, or that you are a failure, or have let yourself or your family down? Not at all [Several Days (1)] : 7.) Trouble concentrating on things, such as reading a newspaper or watching television? Several days [1/2 of Days or More (2)] : 8.) Moving or speaking so slowly that other people could have noticed, or the opposite, moving or speaking faster than usual? Half the days or more [Moderate] : severity of depression is moderate [Somewhat Difficult] : How difficult have these problems made it for you to do your work, take care of things at home, or get along with people? Somewhat difficult [I have developed a follow-up plan documented below in the note.] : I have developed a follow-up plan documented below in the note. [de-identified] : see hpi [Audit-CScore] : 0 [de-identified] : see hpi [de-identified] : see hpi [CMD8Rpgbi] : 3 [[id number]] : 11 [Single] : single [# Of Children ___] : has [unfilled] children [Feels Safe at Home] : Feels safe at home [Reports changes in hearing] : Reports no changes in hearing [Reports changes in vision] : Reports no changes in vision [Reports changes in dental health] : Reports no changes in dental health [de-identified] : With son [AdvancecareDate] : 08/23 [Never] : Never

## 2023-08-22 NOTE — PHYSICAL EXAM
[Normal Oropharynx] : the oropharynx was normal [Normal Outer Ear/Nose] : the outer ears and nose were normal in appearance [No Abdominal Bruit] : a ~M bruit was not heard ~T in the abdomen [No Varicosities] : no varicosities [Pedal Pulses Present] : the pedal pulses are present [No Edema] : there was no peripheral edema [No Palpable Aorta] : no palpable aorta [Normal Posterior Cervical Nodes] : no posterior cervical lymphadenopathy [Normal Anterior Cervical Nodes] : no anterior cervical lymphadenopathy [Normal] : affect was normal and insight and judgment were intact [Normal Appearance] : normal in appearance [No Masses] : no palpable masses [No Nipple Discharge] : no nipple discharge [No Axillary Lymphadenopathy] : no axillary lymphadenopathy [de-identified] : Slight fullness behind left TM without erythema. No wax b/l ears.

## 2023-08-22 NOTE — REVIEW OF SYSTEMS
[Mole Changes] : mole changes [Skin Rash] : skin rash [Negative] : Psychiatric [Earache] : no earache [Hearing Loss] : no hearing loss [Nosebleed] : no nosebleeds [Hoarseness] : no hoarseness [Nasal Discharge] : no nasal discharge [Sore Throat] : no sore throat [Postnasal Drip] : no postnasal drip [Joint Pain] : no joint pain [Joint Stiffness] : no joint stiffness [Joint Swelling] : no joint swelling [Muscle Weakness] : no muscle weakness [Muscle Pain] : no muscle pain [Back Pain] : no back pain [Itching] : no itching [Nail Changes] : no nail changes [Hair Changes] : no hair changes [FreeTextEntry8] : see hpi

## 2023-08-22 NOTE — HISTORY OF PRESENT ILLNESS
[FreeTextEntry1] : CPE [de-identified] : 48 year old female, PMH sciatica, pre-diabetes, gallbladder polyp presenting for CPE.  SHx: , tonsillectomy and adenoidectomy  She brought in her little granddaughter togther and wishes to share information.   She is single and lives with the last sone. 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 She has 3 children including daughter( 29), son (25), son( 15). She has had relationship with her boyfriend who is now in [place].  -surgical hx: She recently got LEEP procedure on 2023 by outside GYN. She was looking for IUD placement for birth control and visited walk in GYN service in [place]. Told her STD test was negative. LMP: 23, menses heavy bleeding. Her menses were regular and normal flow. After LEEP she had heavy period and mild discomfort in vaginal area but feeling better. -Social:No drinking, no smoking, no recreational drug -medication: none. finished gyn med, MVI, glucosamine. -fam hx: mother: Rheumatoid arthritis, several strokes.father: 3months ago passed away from throat cancer then lung meta.  Denied fever, chills,CP, SOB, abdominal pain, n/v/c/d.

## 2023-09-09 ENCOUNTER — TRANSCRIPTION ENCOUNTER (OUTPATIENT)
Age: 48
End: 2023-09-09

## 2023-09-09 LAB
ALBUMIN SERPL ELPH-MCNC: 4.6 G/DL
ALP BLD-CCNC: 53 U/L
ALT SERPL-CCNC: 34 U/L
ANION GAP SERPL CALC-SCNC: 12 MMOL/L
AST SERPL-CCNC: 21 U/L
BILIRUB SERPL-MCNC: 0.3 MG/DL
BUN SERPL-MCNC: 13 MG/DL
CALCIUM SERPL-MCNC: 10.1 MG/DL
CHLORIDE SERPL-SCNC: 101 MMOL/L
CHOLEST SERPL-MCNC: 198 MG/DL
CO2 SERPL-SCNC: 26 MMOL/L
CREAT SERPL-MCNC: 0.72 MG/DL
EGFR: 103 ML/MIN/1.73M2
ESTIMATED AVERAGE GLUCOSE: 126 MG/DL
GLUCOSE SERPL-MCNC: 110 MG/DL
H PYLORI AG STL QL: NEGATIVE
HBA1C MFR BLD HPLC: 6 %
HDLC SERPL-MCNC: 42 MG/DL
LDLC SERPL CALC-MCNC: 107 MG/DL
NONHDLC SERPL-MCNC: 156 MG/DL
POTASSIUM SERPL-SCNC: 4.5 MMOL/L
PROT SERPL-MCNC: 7.9 G/DL
SODIUM SERPL-SCNC: 140 MMOL/L
T4 FREE SERPL-MCNC: 1.1 NG/DL
TRIGL SERPL-MCNC: 288 MG/DL
TSH SERPL-ACNC: 1.39 UIU/ML

## 2024-06-28 ENCOUNTER — APPOINTMENT (OUTPATIENT)
Dept: RADIOLOGY | Facility: IMAGING CENTER | Age: 49
End: 2024-06-28
Payer: COMMERCIAL

## 2024-06-28 ENCOUNTER — APPOINTMENT (OUTPATIENT)
Dept: MAMMOGRAPHY | Facility: IMAGING CENTER | Age: 49
End: 2024-06-28
Payer: COMMERCIAL

## 2024-06-28 ENCOUNTER — APPOINTMENT (OUTPATIENT)
Dept: ULTRASOUND IMAGING | Facility: IMAGING CENTER | Age: 49
End: 2024-06-28
Payer: COMMERCIAL

## 2024-06-28 ENCOUNTER — OUTPATIENT (OUTPATIENT)
Dept: OUTPATIENT SERVICES | Facility: HOSPITAL | Age: 49
LOS: 1 days | End: 2024-06-28
Payer: COMMERCIAL

## 2024-06-28 DIAGNOSIS — Z22.7 LATENT TUBERCULOSIS: ICD-10-CM

## 2024-06-28 PROCEDURE — 73562 X-RAY EXAM OF KNEE 3: CPT

## 2024-06-28 PROCEDURE — 76641 ULTRASOUND BREAST COMPLETE: CPT | Mod: 26,50

## 2024-06-28 PROCEDURE — 71046 X-RAY EXAM CHEST 2 VIEWS: CPT | Mod: 26

## 2024-06-28 PROCEDURE — 77067 SCR MAMMO BI INCL CAD: CPT | Mod: 26

## 2024-06-28 PROCEDURE — 77063 BREAST TOMOSYNTHESIS BI: CPT | Mod: 26

## 2024-06-28 PROCEDURE — 73562 X-RAY EXAM OF KNEE 3: CPT | Mod: 26,RT

## 2024-06-28 PROCEDURE — 77067 SCR MAMMO BI INCL CAD: CPT

## 2024-06-28 PROCEDURE — 76641 ULTRASOUND BREAST COMPLETE: CPT

## 2024-06-28 PROCEDURE — 71046 X-RAY EXAM CHEST 2 VIEWS: CPT

## 2024-06-28 PROCEDURE — 77063 BREAST TOMOSYNTHESIS BI: CPT

## 2024-07-04 ENCOUNTER — TRANSCRIPTION ENCOUNTER (OUTPATIENT)
Age: 49
End: 2024-07-04

## 2024-07-04 DIAGNOSIS — M25.561 PAIN IN RIGHT KNEE: ICD-10-CM

## 2024-07-18 ENCOUNTER — APPOINTMENT (OUTPATIENT)
Dept: ORTHOPEDIC SURGERY | Facility: CLINIC | Age: 49
End: 2024-07-18
Payer: COMMERCIAL

## 2024-07-18 DIAGNOSIS — M76.51 PATELLAR TENDINITIS, RIGHT KNEE: ICD-10-CM

## 2024-07-18 PROCEDURE — 99203 OFFICE O/P NEW LOW 30 MIN: CPT

## 2024-07-18 PROCEDURE — 73564 X-RAY EXAM KNEE 4 OR MORE: CPT | Mod: RT

## 2024-07-18 RX ORDER — DICLOFENAC SODIUM 1% 10 MG/G
1 GEL TOPICAL
Qty: 1 | Refills: 0 | Status: ACTIVE | COMMUNITY
Start: 2024-07-18 | End: 1900-01-01

## 2024-08-23 ENCOUNTER — OUTPATIENT (OUTPATIENT)
Dept: OUTPATIENT SERVICES | Facility: HOSPITAL | Age: 49
LOS: 1 days | End: 2024-08-23
Payer: COMMERCIAL

## 2024-08-23 ENCOUNTER — APPOINTMENT (OUTPATIENT)
Dept: INTERNAL MEDICINE | Facility: CLINIC | Age: 49
End: 2024-08-23
Payer: COMMERCIAL

## 2024-08-23 VITALS
WEIGHT: 169 LBS | HEIGHT: 65 IN | OXYGEN SATURATION: 100 % | SYSTOLIC BLOOD PRESSURE: 120 MMHG | DIASTOLIC BLOOD PRESSURE: 70 MMHG | BODY MASS INDEX: 28.16 KG/M2 | HEART RATE: 75 BPM

## 2024-08-23 DIAGNOSIS — H92.03 OTALGIA, BILATERAL: ICD-10-CM

## 2024-08-23 DIAGNOSIS — R73.03 PREDIABETES.: ICD-10-CM

## 2024-08-23 DIAGNOSIS — I10 ESSENTIAL (PRIMARY) HYPERTENSION: ICD-10-CM

## 2024-08-23 DIAGNOSIS — Z00.00 ENCOUNTER FOR GENERAL ADULT MEDICAL EXAMINATION W/OUT ABNORMAL FINDINGS: ICD-10-CM

## 2024-08-23 PROCEDURE — 83036 HEMOGLOBIN GLYCOSYLATED A1C: CPT

## 2024-08-23 PROCEDURE — 99401 PREV MED CNSL INDIV APPRX 15: CPT

## 2024-08-23 PROCEDURE — G2211 COMPLEX E/M VISIT ADD ON: CPT | Mod: NC

## 2024-08-23 PROCEDURE — 99396 PREV VISIT EST AGE 40-64: CPT | Mod: GC

## 2024-08-23 PROCEDURE — 80061 LIPID PANEL: CPT

## 2024-08-23 PROCEDURE — G0463: CPT

## 2024-08-23 PROCEDURE — 99214 OFFICE O/P EST MOD 30 MIN: CPT | Mod: GC,25

## 2024-08-23 NOTE — HISTORY OF PRESENT ILLNESS
[FreeTextEntry1] : CPE [de-identified] : 48 yo female with a past medical history of sciatica, pre-DM, GERD 2/2 H. pylori, latent TB s/p treatment presenting for CPE.   #hx of ASCUS s/p Leep - Last pap July 11th, pathology: Normal, repeat pap in 6 months - follows with gyn  #Varicose veins, L  - Occupation: School lunch worker, on her feet 6 hrs per day - Wears compression socks while working, difficult to wear when hot - Puts feet up for 30 minutes after work, improves symptoms - Venous ablation, July 2024 - Continues to have intermittent symptoms: pain, edema, sensation changes  #Pre-DM - Family hx of T2DM - A1c in May at vascular surgery, 6.6%  #seasonal allergies? - ears itch daily - Cough worse in spring-summer, worse with increased humidity - Family hx of asthma - Previously treated with mometasone, relieved symptoms  LMP: July 30th, previous 2 months ago, not regular - Hot flashes, vaginal dryness - Denies any urinary symptoms   No other complaints.  Social: Home: Lives with son part time shared custody, otherwise alone Occupation: School lunch worker No concerns with housing, access to food, transportation Exercise: 2-3x week, 30 minutes work-out video Diet: Trying to avoid carbs, eating more vegetables and fruits Alcohol: Social drinker, 1-2x per year, 1 glass of wine  Tobacco: Never

## 2024-08-23 NOTE — REVIEW OF SYSTEMS
[Hot Flashes] : hot flashes [Negative] : Neurological [Fever] : no fever [Chills] : no chills [Hearing Loss] : no hearing loss [Hoarseness] : no hoarseness [Sore Throat] : no sore throat [FreeTextEntry4] : b/l ear itching

## 2024-08-23 NOTE — PHYSICAL EXAM
[No Respiratory Distress] : no respiratory distress  [No Accessory Muscle Use] : no accessory muscle use [Pedal Pulses Present] : the pedal pulses are present [No Edema] : there was no peripheral edema [Normal] : affect was normal and insight and judgment were intact [de-identified] : Mild wheeze b/l at bases [de-identified] : Varicose veins L leg medial ankle

## 2024-08-23 NOTE — ASSESSMENT
[FreeTextEntry1] : 50 yo female with a past medical history of sciatica, T2DM, GERD 2/2 H. pylori, latent TB s/p treatment presenting for CPE.

## 2024-08-23 NOTE — PLAN
[FreeTextEntry1] : #Pre-DM vs Diabetes - Patient reports A1C 6.6% May '24, at vascular surgery, last A1C in clinic was 6.0 Aug 2023 - Repeat A1c today - Encouraged lifestyle modification and dietary changes  #hx of ASCUS s/p Leep - Followed by GYN - Repeat Pap in 6 months  #chronic cough/pruritis of ears c/f seasonal allergies vs asthma  - Strong family history of asthma; Symptoms worse in spring-summer; PE + expiratory wheeze - Referral to allergist  #Varicose veins - No skin breakdown, hyperpigmentation on exam - Followed by vascular - c/w compression stockings and supportive measures - Encouraged daily exercise   #perimenopause - Complaining of hot flashes, vaginal dryness, mild fatigue. Denies any dysuria, hematuria - If symptoms persist or worsen would consider MHT or topical estrogen for symptomatic relief - offered patient referral to women's health   #HCM - Pap smear, repeat in 6 months per/Gyn - Mammo, last recorded '23, per patient recent mammogram '24 - Normal - Colonoscopy referral given - Immunizations: UTD, encouraged flu and covid in fall  RTC - 6 month for follow up allergist, consider referral to Pulm for PFTs, address perimenopause symptoms Case discussed with Dr. Khurram Tidwell MD

## 2024-08-26 DIAGNOSIS — H92.03 OTALGIA, BILATERAL: ICD-10-CM

## 2024-08-26 DIAGNOSIS — Z00.00 ENCOUNTER FOR GENERAL ADULT MEDICAL EXAMINATION WITHOUT ABNORMAL FINDINGS: ICD-10-CM

## 2024-08-26 DIAGNOSIS — R73.03 PREDIABETES: ICD-10-CM

## 2024-08-28 ENCOUNTER — NON-APPOINTMENT (OUTPATIENT)
Age: 49
End: 2024-08-28

## 2024-08-28 LAB
CHOLEST SERPL-MCNC: 191 MG/DL
ESTIMATED AVERAGE GLUCOSE: 134 MG/DL
HBA1C MFR BLD HPLC: 6.3 %
HDLC SERPL-MCNC: 38 MG/DL
LDLC SERPL CALC-MCNC: 111 MG/DL
NONHDLC SERPL-MCNC: 152 MG/DL
TRIGL SERPL-MCNC: 237 MG/DL

## 2024-11-22 ENCOUNTER — APPOINTMENT (OUTPATIENT)
Dept: ORTHOPEDIC SURGERY | Facility: CLINIC | Age: 49
End: 2024-11-22

## 2024-11-25 ENCOUNTER — APPOINTMENT (OUTPATIENT)
Dept: ORTHOPEDIC SURGERY | Facility: CLINIC | Age: 49
End: 2024-11-25
Payer: COMMERCIAL

## 2024-11-25 VITALS
SYSTOLIC BLOOD PRESSURE: 144 MMHG | OXYGEN SATURATION: 95 % | DIASTOLIC BLOOD PRESSURE: 85 MMHG | RESPIRATION RATE: 16 BRPM | HEIGHT: 65 IN | WEIGHT: 169 LBS | BODY MASS INDEX: 28.16 KG/M2 | HEART RATE: 91 BPM

## 2024-11-25 DIAGNOSIS — M54.12 RADICULOPATHY, CERVICAL REGION: ICD-10-CM

## 2024-11-25 PROCEDURE — 99204 OFFICE O/P NEW MOD 45 MIN: CPT

## 2024-11-25 PROCEDURE — 72040 X-RAY EXAM NECK SPINE 2-3 VW: CPT

## 2024-11-25 PROCEDURE — 99214 OFFICE O/P EST MOD 30 MIN: CPT

## 2024-11-25 PROCEDURE — 73030 X-RAY EXAM OF SHOULDER: CPT | Mod: RT

## 2024-11-25 PROCEDURE — 71130 X-RAY STRENOCLAVIC JT 3/>VWS: CPT

## 2024-11-25 RX ORDER — PREDNISONE 50 MG/1
50 TABLET ORAL DAILY
Qty: 5 | Refills: 0 | Status: ACTIVE | COMMUNITY
Start: 2024-11-25 | End: 1900-01-01

## 2025-01-09 ENCOUNTER — LABORATORY RESULT (OUTPATIENT)
Age: 50
End: 2025-01-09

## 2025-01-09 ENCOUNTER — NON-APPOINTMENT (OUTPATIENT)
Age: 50
End: 2025-01-09

## 2025-01-09 ENCOUNTER — APPOINTMENT (OUTPATIENT)
Dept: PEDIATRIC ALLERGY IMMUNOLOGY | Facility: CLINIC | Age: 50
End: 2025-01-09

## 2025-01-09 VITALS
HEART RATE: 82 BPM | OXYGEN SATURATION: 96 % | HEIGHT: 65 IN | DIASTOLIC BLOOD PRESSURE: 84 MMHG | SYSTOLIC BLOOD PRESSURE: 145 MMHG | BODY MASS INDEX: 27.82 KG/M2 | WEIGHT: 167 LBS

## 2025-01-09 DIAGNOSIS — J45.30 MILD PERSISTENT ASTHMA, UNCOMPLICATED: ICD-10-CM

## 2025-01-09 PROCEDURE — 99204 OFFICE O/P NEW MOD 45 MIN: CPT | Mod: 25

## 2025-01-09 PROCEDURE — 94060 EVALUATION OF WHEEZING: CPT

## 2025-01-09 PROCEDURE — 95012 NITRIC OXIDE EXP GAS DETER: CPT

## 2025-01-09 PROCEDURE — 95004 PERQ TESTS W/ALRGNC XTRCS: CPT | Mod: 52

## 2025-01-09 RX ORDER — ALBUTEROL SULFATE 90 UG/1
108 (90 BASE) INHALANT RESPIRATORY (INHALATION)
Qty: 1 | Refills: 3 | Status: ACTIVE | COMMUNITY
Start: 2025-01-09 | End: 1900-01-01

## 2025-01-09 RX ORDER — BUDESONIDE AND FORMOTEROL FUMARATE DIHYDRATE 80; 4.5 UG/1; UG/1
80-4.5 AEROSOL RESPIRATORY (INHALATION) TWICE DAILY
Qty: 1 | Refills: 5 | Status: ACTIVE | COMMUNITY
Start: 2025-01-09 | End: 1900-01-01

## 2025-01-09 RX ORDER — CETIRIZINE HYDROCHLORIDE 10 MG/1
10 TABLET, COATED ORAL
Qty: 1 | Refills: 3 | Status: ACTIVE | COMMUNITY
Start: 2025-01-09 | End: 1900-01-01

## 2025-01-10 LAB
BASOPHILS # BLD AUTO: 0.1 K/UL
BASOPHILS NFR BLD AUTO: 1 %
EOSINOPHIL # BLD AUTO: 0.48 K/UL
EOSINOPHIL NFR BLD AUTO: 5 %
HCT VFR BLD CALC: 44.8 %
HGB BLD-MCNC: 14.8 G/DL
IMM GRANULOCYTES NFR BLD AUTO: 0.3 %
LYMPHOCYTES # BLD AUTO: 3.41 K/UL
LYMPHOCYTES NFR BLD AUTO: 35.4 %
MAN DIFF?: NORMAL
MCHC RBC-ENTMCNC: 31 PG
MCHC RBC-ENTMCNC: 33 G/DL
MCV RBC AUTO: 93.9 FL
MONOCYTES # BLD AUTO: 0.84 K/UL
MONOCYTES NFR BLD AUTO: 8.7 %
NEUTROPHILS # BLD AUTO: 4.78 K/UL
NEUTROPHILS NFR BLD AUTO: 49.6 %
PLATELET # BLD AUTO: 317 K/UL
RBC # BLD: 4.77 M/UL
RBC # FLD: 12.2 %
WBC # FLD AUTO: 9.64 K/UL

## 2025-01-11 LAB
A ALTERNATA IGE QN: <0.1 KUA/L
A FUMIGATUS IGE QN: <0.1 KUA/L
AMER BEECH IGE QN: 0
BERMUDA GRASS IGE QN: <0.1 KUA/L
BOXELDER IGE QN: <0.1 KUA/L
C HERBARUM IGE QN: <0.1 KUA/L
C LUNATA IGE QN: <0.1 KUA/L
CALIF WALNUT IGE QN: <0.1 KUA/L
CAT DANDER IGE QN: <0.1 KUA/L
CMN PIGWEED IGE QN: <0.1 KUA/L
COCKLEBUR IGE QN: <0.1 KUA/L
COCKSFOOT IGE QN: <0.1 KUA/L
COMMON RAGWEED IGE QN: <0.1 KUA/L
COTTONWOOD IGE QN: <0.1 KUA/L
D FARINAE IGE QN: <0.1 KUA/L
D PTERONYSS IGE QN: <0.1 KUA/L
DEPRECATED A ALTERNATA IGE RAST QL: 0
DEPRECATED A FUMIGATUS IGE RAST QL: 0
DEPRECATED A PULLULANS IGE RAST QL: 0
DEPRECATED AMER BEECH IGE RAST QL: <0.1 KUA/L
DEPRECATED BERMUDA GRASS IGE RAST QL: 0
DEPRECATED BOXELDER IGE RAST QL: 0
DEPRECATED C HERBARUM IGE RAST QL: 0
DEPRECATED C LUNATA IGE RAST QL: 0
DEPRECATED CAT DANDER IGE RAST QL: 0
DEPRECATED COCKLEBUR IGE RAST QL: 0
DEPRECATED COCKSFOOT IGE RAST QL: 0
DEPRECATED COMMON PIGWEED IGE RAST QL: 0
DEPRECATED COMMON RAGWEED IGE RAST QL: 0
DEPRECATED COTTONWOOD IGE RAST QL: 0
DEPRECATED D FARINAE IGE RAST QL: 0
DEPRECATED D PTERONYSS IGE RAST QL: 0
DEPRECATED DOG DANDER IGE RAST QL: 0
DEPRECATED ENGL PLANTAIN IGE RAST QL: 0
DEPRECATED F MONILIFORME IGE RAST QL: 0
DEPRECATED GIANT RAGWEED IGE RAST QL: 0
DEPRECATED GOOSE FEATHER IGE RAST QL: 0
DEPRECATED GOOSEFOOT IGE RAST QL: 0
DEPRECATED JOHNSON GRASS IGE RAST QL: 0
DEPRECATED KENT BLUE GRASS IGE RAST QL: 0
DEPRECATED LONDON PLANE IGE RAST QL: 0
DEPRECATED MEADOW FESCUE IGE RAST QL: 0
DEPRECATED MOUSE URINE PROT IGE RAST QL: 0
DEPRECATED MUGWORT IGE RAST QL: 0
DEPRECATED P NOTATUM IGE RAST QL: 0
DEPRECATED RED CEDAR IGE RAST QL: 0
DEPRECATED RED TOP GRASS IGE RAST QL: 0
DEPRECATED ROACH IGE RAST QL: 0
DEPRECATED RYE IGE RAST QL: 0
DEPRECATED SALTWORT IGE RAST QL: 0
DEPRECATED SILVER BIRCH IGE RAST QL: 0
DEPRECATED SW VERNAL GRASS IGE RAST QL: 0
DEPRECATED TIMOTHY IGE RAST QL: 0
DEPRECATED WHITE ASH IGE RAST QL: 0
DEPRECATED WHITE HICKORY IGE RAST QL: 0
DEPRECATED WHITE OAK IGE RAST QL: 0
DOG DANDER IGE QN: <0.1 KUA/L
ENGL PLANTAIN IGE QN: <0.1 KUA/L
F MONILIFORME IGE QN: <0.1 KUA/L
GIANT RAGWEED IGE QN: <0.1 KUA/L
GOOSE FEATHER IGE QN: <0.1 KUA/L
GOOSEFOOT IGE QN: <0.1 KUA/L
IGE SER-MCNC: 138 KU/L
JOHNSON GRASS IGE QN: <0.1 KUA/L
KENT BLUE GRASS IGE QN: <0.1 KUA/L
LONDON PLANE IGE QN: <0.1 KUA/L
MEADOW FESCUE IGE QN: <0.1 KUA/L
MOLD (AUREOBASIDIUM M12) CONC: <0.1 KUA/L
MOUSE URINE PROT IGE QN: <0.1 KUA/L
MUGWORT IGE QN: <0.1 KUA/L
MULBERRY (T70) CLASS: 0
MULBERRY (T70) CONC: <0.1 KUA/L
P NOTATUM IGE QN: <0.1 KUA/L
RED CEDAR IGE QN: <0.1 KUA/L
RED TOP GRASS IGE QN: <0.1 KUA/L
ROACH IGE QN: <0.1 KUA/L
RYE IGE QN: <0.1 KUA/L
SALTWORT IGE QN: <0.1 KUA/L
SILVER BIRCH IGE QN: <0.1 KUA/L
SW VERNAL GRASS IGE QN: <0.1 KUA/L
TIMOTHY IGE QN: <0.1 KUA/L
TREE ALLERG MIX1 IGE QL: 0
WHITE ASH IGE QN: <0.1 KUA/L
WHITE ELM IGE QN: 0
WHITE ELM IGE QN: <0.1 KUA/L
WHITE HICKORY IGE QN: <0.1 KUA/L
WHITE OAK IGE QN: <0.1 KUA/L

## 2025-02-12 ENCOUNTER — NON-APPOINTMENT (OUTPATIENT)
Age: 50
End: 2025-02-12

## 2025-02-12 ENCOUNTER — APPOINTMENT (OUTPATIENT)
Dept: PEDIATRIC ALLERGY IMMUNOLOGY | Facility: CLINIC | Age: 50
End: 2025-02-12
Payer: COMMERCIAL

## 2025-02-12 VITALS
HEART RATE: 77 BPM | WEIGHT: 169.25 LBS | SYSTOLIC BLOOD PRESSURE: 131 MMHG | OXYGEN SATURATION: 97 % | HEIGHT: 65 IN | BODY MASS INDEX: 28.2 KG/M2 | DIASTOLIC BLOOD PRESSURE: 79 MMHG

## 2025-02-12 DIAGNOSIS — J45.40 MODERATE PERSISTENT ASTHMA, UNCOMPLICATED: ICD-10-CM

## 2025-02-12 DIAGNOSIS — J31.0 CHRONIC RHINITIS: ICD-10-CM

## 2025-02-12 PROCEDURE — 95012 NITRIC OXIDE EXP GAS DETER: CPT

## 2025-02-12 PROCEDURE — 94010 BREATHING CAPACITY TEST: CPT

## 2025-02-12 PROCEDURE — 99214 OFFICE O/P EST MOD 30 MIN: CPT | Mod: 25

## 2025-02-12 RX ORDER — BUDESONIDE AND FORMOTEROL FUMARATE DIHYDRATE 160; 4.5 UG/1; UG/1
160-4.5 AEROSOL RESPIRATORY (INHALATION) TWICE DAILY
Qty: 1 | Refills: 3 | Status: ACTIVE | COMMUNITY
Start: 2025-02-12 | End: 1900-01-01

## 2025-02-13 ENCOUNTER — APPOINTMENT (OUTPATIENT)
Dept: OTOLARYNGOLOGY | Facility: CLINIC | Age: 50
End: 2025-02-13
Payer: COMMERCIAL

## 2025-02-13 DIAGNOSIS — R09.82 POSTNASAL DRIP: ICD-10-CM

## 2025-02-13 DIAGNOSIS — R09.81 NASAL CONGESTION: ICD-10-CM

## 2025-02-13 DIAGNOSIS — Z80.0 FAMILY HISTORY OF MALIGNANT NEOPLASM OF DIGESTIVE ORGANS: ICD-10-CM

## 2025-02-13 DIAGNOSIS — J33.9 NASAL POLYP, UNSPECIFIED: ICD-10-CM

## 2025-02-13 DIAGNOSIS — L29.9 PRURITUS, UNSPECIFIED: ICD-10-CM

## 2025-02-13 PROCEDURE — 31231 NASAL ENDOSCOPY DX: CPT

## 2025-02-13 PROCEDURE — 99204 OFFICE O/P NEW MOD 45 MIN: CPT | Mod: 25

## 2025-02-13 RX ORDER — MOMETASONE FUROATE 1 MG/ML
0.1 SOLUTION TOPICAL
Qty: 1 | Refills: 5 | Status: ACTIVE | COMMUNITY
Start: 2025-02-13 | End: 1900-01-01

## 2025-02-13 RX ORDER — FLUTICASONE PROPIONATE 50 UG/1
50 SPRAY, METERED NASAL TWICE DAILY
Qty: 1 | Refills: 5 | Status: ACTIVE | COMMUNITY
Start: 2025-02-13 | End: 1900-01-01

## 2025-02-13 RX ORDER — AZELASTINE HYDROCHLORIDE 137 UG/1
137 SPRAY, METERED NASAL TWICE DAILY
Qty: 1 | Refills: 5 | Status: ACTIVE | COMMUNITY
Start: 2025-02-13 | End: 1900-01-01

## 2025-02-13 RX ORDER — BUDESONIDE, MICRONIZED 100 %
POWDER (GRAM) MISCELLANEOUS
Qty: 108 | Refills: 3 | Status: ACTIVE | COMMUNITY
Start: 2025-02-13 | End: 1900-01-01

## 2025-02-20 ENCOUNTER — APPOINTMENT (OUTPATIENT)
Dept: CT IMAGING | Facility: IMAGING CENTER | Age: 50
End: 2025-02-20

## 2025-02-20 ENCOUNTER — OUTPATIENT (OUTPATIENT)
Dept: OUTPATIENT SERVICES | Facility: HOSPITAL | Age: 50
LOS: 1 days | End: 2025-02-20

## 2025-02-20 ENCOUNTER — OUTPATIENT (OUTPATIENT)
Dept: OUTPATIENT SERVICES | Facility: HOSPITAL | Age: 50
LOS: 1 days | End: 2025-02-20
Payer: COMMERCIAL

## 2025-02-20 DIAGNOSIS — Z00.8 ENCOUNTER FOR OTHER GENERAL EXAMINATION: ICD-10-CM

## 2025-02-20 DIAGNOSIS — J33.9 NASAL POLYP, UNSPECIFIED: ICD-10-CM

## 2025-02-20 DIAGNOSIS — R09.81 NASAL CONGESTION: ICD-10-CM

## 2025-02-20 PROCEDURE — 70486 CT MAXILLOFACIAL W/O DYE: CPT

## 2025-02-20 PROCEDURE — 70486 CT MAXILLOFACIAL W/O DYE: CPT | Mod: 26

## 2025-02-26 RX ORDER — BUDESONIDE 0.5 MG/2ML
0.5 INHALANT ORAL
Qty: 1 | Refills: 5 | Status: ACTIVE | COMMUNITY
Start: 2025-02-26 | End: 1900-01-01

## 2025-02-26 RX ORDER — BUDESONIDE 0.25 MG/2ML
0.25 INHALANT ORAL
Qty: 1 | Refills: 3 | Status: DISCONTINUED | COMMUNITY
Start: 2025-02-26 | End: 2025-02-26

## 2025-03-17 ENCOUNTER — APPOINTMENT (OUTPATIENT)
Dept: OTOLARYNGOLOGY | Facility: CLINIC | Age: 50
End: 2025-03-17
Payer: COMMERCIAL

## 2025-03-17 VITALS
HEART RATE: 70 BPM | WEIGHT: 168 LBS | HEIGHT: 65 IN | DIASTOLIC BLOOD PRESSURE: 84 MMHG | SYSTOLIC BLOOD PRESSURE: 137 MMHG | BODY MASS INDEX: 27.99 KG/M2 | OXYGEN SATURATION: 98 %

## 2025-03-17 DIAGNOSIS — J31.0 CHRONIC RHINITIS: ICD-10-CM

## 2025-03-17 DIAGNOSIS — J33.9 NASAL POLYP, UNSPECIFIED: ICD-10-CM

## 2025-03-17 DIAGNOSIS — J34.2 DEVIATED NASAL SEPTUM: ICD-10-CM

## 2025-03-17 PROCEDURE — 99213 OFFICE O/P EST LOW 20 MIN: CPT | Mod: 25

## 2025-03-17 PROCEDURE — 31231 NASAL ENDOSCOPY DX: CPT | Mod: 52

## 2025-03-17 PROCEDURE — 99214 OFFICE O/P EST MOD 30 MIN: CPT | Mod: 25

## 2025-04-03 ENCOUNTER — NON-APPOINTMENT (OUTPATIENT)
Age: 50
End: 2025-04-03

## 2025-04-03 ENCOUNTER — APPOINTMENT (OUTPATIENT)
Dept: PEDIATRIC ALLERGY IMMUNOLOGY | Facility: CLINIC | Age: 50
End: 2025-04-03
Payer: COMMERCIAL

## 2025-04-03 VITALS — HEART RATE: 80 BPM | OXYGEN SATURATION: 96 %

## 2025-04-03 DIAGNOSIS — J33.9 NASAL POLYP, UNSPECIFIED: ICD-10-CM

## 2025-04-03 DIAGNOSIS — R09.81 NASAL CONGESTION: ICD-10-CM

## 2025-04-03 DIAGNOSIS — J45.40 MODERATE PERSISTENT ASTHMA, UNCOMPLICATED: ICD-10-CM

## 2025-04-03 PROCEDURE — 99214 OFFICE O/P EST MOD 30 MIN: CPT | Mod: 25

## 2025-04-03 PROCEDURE — 94010 BREATHING CAPACITY TEST: CPT

## 2025-04-03 PROCEDURE — 95012 NITRIC OXIDE EXP GAS DETER: CPT

## 2025-04-09 ENCOUNTER — RX RENEWAL (OUTPATIENT)
Age: 50
End: 2025-04-09

## 2025-05-07 ENCOUNTER — APPOINTMENT (OUTPATIENT)
Dept: PEDIATRIC ALLERGY IMMUNOLOGY | Facility: CLINIC | Age: 50
End: 2025-05-07
Payer: COMMERCIAL

## 2025-05-07 ENCOUNTER — NON-APPOINTMENT (OUTPATIENT)
Age: 50
End: 2025-05-07

## 2025-05-07 VITALS
SYSTOLIC BLOOD PRESSURE: 138 MMHG | HEART RATE: 87 BPM | WEIGHT: 172.25 LBS | BODY MASS INDEX: 28.7 KG/M2 | DIASTOLIC BLOOD PRESSURE: 67 MMHG | HEIGHT: 65 IN | OXYGEN SATURATION: 97 %

## 2025-05-07 DIAGNOSIS — R05.9 COUGH, UNSPECIFIED: ICD-10-CM

## 2025-05-07 DIAGNOSIS — J32.9 CHRONIC SINUSITIS, UNSPECIFIED: ICD-10-CM

## 2025-05-07 DIAGNOSIS — J45.998 OTHER ASTHMA: ICD-10-CM

## 2025-05-07 PROCEDURE — 95012 NITRIC OXIDE EXP GAS DETER: CPT

## 2025-05-07 PROCEDURE — 94010 BREATHING CAPACITY TEST: CPT

## 2025-05-07 PROCEDURE — 36415 COLL VENOUS BLD VENIPUNCTURE: CPT

## 2025-05-07 PROCEDURE — 99204 OFFICE O/P NEW MOD 45 MIN: CPT | Mod: 25

## 2025-05-07 RX ORDER — MONTELUKAST 10 MG/1
10 TABLET, FILM COATED ORAL
Qty: 1 | Refills: 1 | Status: ACTIVE | COMMUNITY
Start: 2025-05-07 | End: 1900-01-01

## 2025-05-09 LAB
ALBUMIN MFR SERPL ELPH: 55.5 %
ALBUMIN SERPL-MCNC: 4.3 G/DL
ALBUMIN/GLOB SERPL: 1.2 RATIO
ALPHA1 GLOB MFR SERPL ELPH: 3.5 %
ALPHA1 GLOB SERPL ELPH-MCNC: 0.3 G/DL
ALPHA2 GLOB MFR SERPL ELPH: 7.9 %
ALPHA2 GLOB SERPL ELPH-MCNC: 0.6 G/DL
B-GLOBULIN MFR SERPL ELPH: 14.1 %
B-GLOBULIN SERPL ELPH-MCNC: 1.1 G/DL
CH50 SERPL-MCNC: 25 U/ML
DEPRECATED KAPPA LC FREE/LAMBDA SER: 1.04 RATIO
GAMMA GLOB FLD ELPH-MCNC: 1.5 G/DL
GAMMA GLOB MFR SERPL ELPH: 19 %
IGA SERPL-MCNC: 434 MG/DL
IGG SERPL-MCNC: 1418 MG/DL
IGM SERPL-MCNC: 204 MG/DL
INTERPRETATION SERPL IEP-IMP: NORMAL
KAPPA LC CSF-MCNC: 2.22 MG/DL
KAPPA LC SERPL-MCNC: 2.3 MG/DL
M PROTEIN SPEC IFE-MCNC: NORMAL
PROT SERPL-MCNC: 7.8 G/DL
PROT SERPL-MCNC: 7.8 G/DL
TRYPTASE: 4.2 UG/L

## 2025-05-12 ENCOUNTER — APPOINTMENT (OUTPATIENT)
Dept: OTOLARYNGOLOGY | Facility: CLINIC | Age: 50
End: 2025-05-12
Payer: COMMERCIAL

## 2025-05-12 VITALS
WEIGHT: 172 LBS | DIASTOLIC BLOOD PRESSURE: 65 MMHG | SYSTOLIC BLOOD PRESSURE: 125 MMHG | BODY MASS INDEX: 28.66 KG/M2 | HEIGHT: 65 IN | HEART RATE: 76 BPM | OXYGEN SATURATION: 99 % | TEMPERATURE: 98.2 F

## 2025-05-12 DIAGNOSIS — R09.81 NASAL CONGESTION: ICD-10-CM

## 2025-05-12 DIAGNOSIS — J33.9 NASAL POLYP, UNSPECIFIED: ICD-10-CM

## 2025-05-12 DIAGNOSIS — J34.2 DEVIATED NASAL SEPTUM: ICD-10-CM

## 2025-05-12 PROCEDURE — 99213 OFFICE O/P EST LOW 20 MIN: CPT | Mod: 25

## 2025-05-12 PROCEDURE — 31231 NASAL ENDOSCOPY DX: CPT

## 2025-05-13 LAB — MANNAN BINDING LECTIN (MBL): 37 NG/ML

## 2025-05-19 ENCOUNTER — APPOINTMENT (OUTPATIENT)
Dept: RADIOLOGY | Facility: IMAGING CENTER | Age: 50
End: 2025-05-19

## 2025-05-21 ENCOUNTER — APPOINTMENT (OUTPATIENT)
Dept: RADIOLOGY | Facility: IMAGING CENTER | Age: 50
End: 2025-05-21
Payer: COMMERCIAL

## 2025-05-21 ENCOUNTER — OUTPATIENT (OUTPATIENT)
Dept: OUTPATIENT SERVICES | Facility: HOSPITAL | Age: 50
LOS: 1 days | End: 2025-05-21
Payer: COMMERCIAL

## 2025-05-21 DIAGNOSIS — R05.9 COUGH, UNSPECIFIED: ICD-10-CM

## 2025-05-21 PROCEDURE — 71045 X-RAY EXAM CHEST 1 VIEW: CPT

## 2025-05-21 PROCEDURE — 71045 X-RAY EXAM CHEST 1 VIEW: CPT | Mod: 26

## 2025-05-28 ENCOUNTER — NON-APPOINTMENT (OUTPATIENT)
Age: 50
End: 2025-05-28

## 2025-06-09 ENCOUNTER — RX RENEWAL (OUTPATIENT)
Age: 50
End: 2025-06-09

## 2025-06-27 ENCOUNTER — APPOINTMENT (OUTPATIENT)
Dept: PEDIATRIC PULMONARY CYSTIC FIB | Facility: CLINIC | Age: 50
End: 2025-06-27

## 2025-07-01 ENCOUNTER — APPOINTMENT (OUTPATIENT)
Dept: PEDIATRIC PULMONARY CYSTIC FIB | Facility: CLINIC | Age: 50
End: 2025-07-01

## 2025-07-02 ENCOUNTER — NON-APPOINTMENT (OUTPATIENT)
Age: 50
End: 2025-07-02

## 2025-07-09 ENCOUNTER — APPOINTMENT (OUTPATIENT)
Dept: PEDIATRIC ALLERGY IMMUNOLOGY | Facility: CLINIC | Age: 50
End: 2025-07-09
Payer: COMMERCIAL

## 2025-07-09 VITALS
DIASTOLIC BLOOD PRESSURE: 77 MMHG | SYSTOLIC BLOOD PRESSURE: 115 MMHG | BODY MASS INDEX: 29.12 KG/M2 | WEIGHT: 175 LBS | HEART RATE: 74 BPM

## 2025-07-09 PROCEDURE — 36415 COLL VENOUS BLD VENIPUNCTURE: CPT

## 2025-07-09 PROCEDURE — 96160 PT-FOCUSED HLTH RISK ASSMT: CPT | Mod: 59

## 2025-07-09 PROCEDURE — 99214 OFFICE O/P EST MOD 30 MIN: CPT | Mod: 25

## 2025-07-09 PROCEDURE — 95004 PERQ TESTS W/ALRGNC XTRCS: CPT

## 2025-07-10 RX ORDER — DUPILUMAB 300 MG/2ML
300 INJECTION, SOLUTION SUBCUTANEOUS
Qty: 1 | Refills: 10 | Status: ACTIVE | COMMUNITY
Start: 2025-07-09

## 2025-07-16 ENCOUNTER — NON-APPOINTMENT (OUTPATIENT)
Age: 50
End: 2025-07-16

## 2025-07-16 LAB
C TETANI IGG SER-ACNC: 1.82 IU/ML
CH50 SERPL-MCNC: 38 U/ML
DEPRECATED S PNEUM 1 IGG SER-MCNC: 2.4 MCG/ML
DEPRECATED S PNEUM12 AB SER-ACNC: 0.9 MCG/ML
DEPRECATED S PNEUM14 AB SER-ACNC: 3.8 MCG/ML
DEPRECATED S PNEUM17 IGG SER IA-MCNC: 2 MCG/ML
DEPRECATED S PNEUM18 IGG SER IA-MCNC: 0.6 MCG/ML
DEPRECATED S PNEUM19 IGG SER-MCNC: 3.1 MCG/ML
DEPRECATED S PNEUM19 IGG SER-MCNC: 4.3 MCG/ML
DEPRECATED S PNEUM2 IGG SER-MCNC: 1.7 MCG/ML
DEPRECATED S PNEUM20 IGG SER-MCNC: 3.8 MCG/ML
DEPRECATED S PNEUM22 IGG SER-MCNC: 2.3 MCG/ML
DEPRECATED S PNEUM23 AB SER-ACNC: 2 MCG/ML
DEPRECATED S PNEUM3 AB SER-ACNC: 0.3 MCG/ML
DEPRECATED S PNEUM34 IGG SER-MCNC: 1.7 MCG/ML
DEPRECATED S PNEUM4 AB SER-ACNC: 1 MCG/ML
DEPRECATED S PNEUM5 IGG SER-MCNC: 0.9 MCG/ML
DEPRECATED S PNEUM6 IGG SER-MCNC: 4 MCG/ML
DEPRECATED S PNEUM7 IGG SER-ACNC: 1.3 MCG/ML
DEPRECATED S PNEUM8 AB SER-ACNC: 3 MCG/ML
DEPRECATED S PNEUM9 AB SER-ACNC: 1.4 MCG/ML
DEPRECATED S PNEUM9 IGG SER-MCNC: 2 MCG/ML
HAEM INFLU B AB SER-MCNC: >9 UG/ML
IMMUNOLOGIST REVIEW: NORMAL
STREPTOCOCCUS PNEUMONIAE SEROTYPE 11A: 0.5 MCG/ML
STREPTOCOCCUS PNEUMONIAE SEROTYPE 15B: 4.1 MCG/ML
STREPTOCOCCUS PNEUMONIAE SEROTYPE 33F: 8.1 MCG/ML

## 2025-07-17 ENCOUNTER — APPOINTMENT (OUTPATIENT)
Dept: OTOLARYNGOLOGY | Facility: CLINIC | Age: 50
End: 2025-07-17
Payer: COMMERCIAL

## 2025-07-17 ENCOUNTER — NON-APPOINTMENT (OUTPATIENT)
Age: 50
End: 2025-07-17

## 2025-07-17 VITALS
OXYGEN SATURATION: 99 % | HEIGHT: 65 IN | SYSTOLIC BLOOD PRESSURE: 123 MMHG | WEIGHT: 170 LBS | HEART RATE: 76 BPM | DIASTOLIC BLOOD PRESSURE: 82 MMHG | BODY MASS INDEX: 28.32 KG/M2

## 2025-07-17 LAB
SEROGROUP A: 0.5 UG/ML
SEROGROUP C: <0.5 UG/ML
SEROGROUP W135: <0.5 UG/ML
SEROGROUP Y: 3.8 UG/ML

## 2025-07-17 PROCEDURE — 99213 OFFICE O/P EST LOW 20 MIN: CPT | Mod: 25

## 2025-07-17 PROCEDURE — 31231 NASAL ENDOSCOPY DX: CPT

## 2025-08-07 ENCOUNTER — NON-APPOINTMENT (OUTPATIENT)
Age: 50
End: 2025-08-07

## 2025-08-12 ENCOUNTER — NON-APPOINTMENT (OUTPATIENT)
Age: 50
End: 2025-08-12

## 2025-08-12 ENCOUNTER — APPOINTMENT (OUTPATIENT)
Dept: INTERNAL MEDICINE | Facility: CLINIC | Age: 50
End: 2025-08-12

## 2025-08-12 ENCOUNTER — APPOINTMENT (OUTPATIENT)
Dept: PEDIATRIC ALLERGY IMMUNOLOGY | Facility: CLINIC | Age: 50
End: 2025-08-12
Payer: COMMERCIAL

## 2025-08-12 ENCOUNTER — OUTPATIENT (OUTPATIENT)
Dept: OUTPATIENT SERVICES | Facility: HOSPITAL | Age: 50
LOS: 1 days | End: 2025-08-12

## 2025-08-12 VITALS
BODY MASS INDEX: 28.24 KG/M2 | OXYGEN SATURATION: 95 % | SYSTOLIC BLOOD PRESSURE: 112 MMHG | WEIGHT: 169.5 LBS | DIASTOLIC BLOOD PRESSURE: 70 MMHG | HEART RATE: 78 BPM | HEIGHT: 65 IN

## 2025-08-12 DIAGNOSIS — Z01.89 ENCOUNTER FOR OTHER SPECIFIED SPECIAL EXAMINATIONS: ICD-10-CM

## 2025-08-12 DIAGNOSIS — J45.30 MILD PERSISTENT ASTHMA, UNCOMPLICATED: ICD-10-CM

## 2025-08-12 DIAGNOSIS — J32.9 CHRONIC SINUSITIS, UNSPECIFIED: ICD-10-CM

## 2025-08-12 DIAGNOSIS — J45.40 MODERATE PERSISTENT ASTHMA, UNCOMPLICATED: ICD-10-CM

## 2025-08-12 DIAGNOSIS — D89.89 OTHER SPECIFIED DISORDERS INVOLVING THE IMMUNE MECHANISM, NOT ELSEWHERE CLASSIFIED: ICD-10-CM

## 2025-08-12 PROCEDURE — 94070 EVALUATION OF WHEEZING: CPT

## 2025-08-12 PROCEDURE — 95076 INGEST CHALLENGE INI 120 MIN: CPT

## 2025-08-12 PROCEDURE — 95012 NITRIC OXIDE EXP GAS DETER: CPT

## 2025-08-12 PROCEDURE — 36415 COLL VENOUS BLD VENIPUNCTURE: CPT

## 2025-08-12 PROCEDURE — 95079 INGEST CHALLENGE ADDL 60 MIN: CPT

## 2025-08-12 RX ORDER — DUPILUMAB 300 MG/2ML
300 INJECTION, SOLUTION SUBCUTANEOUS
Qty: 1 | Refills: 6 | Status: ACTIVE | COMMUNITY
Start: 2025-08-12 | End: 1900-01-01

## 2025-08-14 ENCOUNTER — APPOINTMENT (OUTPATIENT)
Dept: PEDIATRIC ALLERGY IMMUNOLOGY | Facility: CLINIC | Age: 50
End: 2025-08-14
Payer: COMMERCIAL

## 2025-08-14 VITALS — HEART RATE: 79 BPM | OXYGEN SATURATION: 98 %

## 2025-08-14 DIAGNOSIS — J33.9 NASAL POLYP, UNSPECIFIED: ICD-10-CM

## 2025-08-14 LAB
ALBUMIN SERPL ELPH-MCNC: 4.4 G/DL
ALP BLD-CCNC: 93 U/L
ALT SERPL-CCNC: 74 U/L
ANION GAP SERPL CALC-SCNC: 15 MMOL/L
AST SERPL-CCNC: 33 U/L
BASOPHILS # BLD AUTO: 0.07 K/UL
BASOPHILS NFR BLD AUTO: 1 %
BILIRUB SERPL-MCNC: 0.2 MG/DL
BUN SERPL-MCNC: 13 MG/DL
CALCIUM SERPL-MCNC: 10.3 MG/DL
CHLORIDE SERPL-SCNC: 101 MMOL/L
CO2 SERPL-SCNC: 27 MMOL/L
CREAT SERPL-MCNC: 0.59 MG/DL
EGFRCR SERPLBLD CKD-EPI 2021: 110 ML/MIN/1.73M2
EOSINOPHIL # BLD AUTO: 0.29 K/UL
EOSINOPHIL NFR BLD AUTO: 4.1 %
GLUCOSE SERPL-MCNC: 139 MG/DL
HCT VFR BLD CALC: 42.9 %
HGB BLD-MCNC: 14.1 G/DL
IMM GRANULOCYTES NFR BLD AUTO: 0.1 %
LYMPHOCYTES # BLD AUTO: 2.67 K/UL
LYMPHOCYTES NFR BLD AUTO: 37.6 %
MAN DIFF?: NORMAL
MCHC RBC-ENTMCNC: 31.3 PG
MCHC RBC-ENTMCNC: 32.9 G/DL
MCV RBC AUTO: 95.1 FL
MONOCYTES # BLD AUTO: 0.76 K/UL
MONOCYTES NFR BLD AUTO: 10.7 %
NEUTROPHILS # BLD AUTO: 3.31 K/UL
NEUTROPHILS NFR BLD AUTO: 46.5 %
PLATELET # BLD AUTO: 346 K/UL
POTASSIUM SERPL-SCNC: 4.4 MMOL/L
PROT SERPL-MCNC: 7.7 G/DL
RBC # BLD: 4.51 M/UL
RBC # FLD: 12.5 %
SODIUM SERPL-SCNC: 142 MMOL/L
TRYPTASE: 3.2 UG/L
WBC # FLD AUTO: 7.11 K/UL

## 2025-08-14 PROCEDURE — 99213 OFFICE O/P EST LOW 20 MIN: CPT | Mod: 25

## 2025-08-21 LAB
2,3-DINOR-11B-PROSTAGLANDIN F2A, RANDOM URINE: 1126 PG/MG CR
CREATININE RANDOM URINE: 119 MG/DL

## 2025-08-25 ENCOUNTER — OUTPATIENT (OUTPATIENT)
Dept: OUTPATIENT SERVICES | Facility: HOSPITAL | Age: 50
LOS: 1 days | End: 2025-08-25
Payer: COMMERCIAL

## 2025-08-25 ENCOUNTER — APPOINTMENT (OUTPATIENT)
Dept: INTERNAL MEDICINE | Facility: CLINIC | Age: 50
End: 2025-08-25
Payer: COMMERCIAL

## 2025-08-25 ENCOUNTER — MED ADMIN CHARGE (OUTPATIENT)
Age: 50
End: 2025-08-25

## 2025-08-25 VITALS
WEIGHT: 170 LBS | HEIGHT: 65 IN | OXYGEN SATURATION: 97 % | BODY MASS INDEX: 28.32 KG/M2 | DIASTOLIC BLOOD PRESSURE: 70 MMHG | HEART RATE: 72 BPM | SYSTOLIC BLOOD PRESSURE: 116 MMHG

## 2025-08-25 DIAGNOSIS — Z00.00 ENCOUNTER FOR GENERAL ADULT MEDICAL EXAMINATION W/OUT ABNORMAL FINDINGS: ICD-10-CM

## 2025-08-25 DIAGNOSIS — I10 ESSENTIAL (PRIMARY) HYPERTENSION: ICD-10-CM

## 2025-08-25 DIAGNOSIS — Z23 ENCOUNTER FOR IMMUNIZATION: ICD-10-CM

## 2025-08-25 PROCEDURE — 80053 COMPREHEN METABOLIC PANEL: CPT

## 2025-08-25 PROCEDURE — 83036 HEMOGLOBIN GLYCOSYLATED A1C: CPT

## 2025-08-25 PROCEDURE — G0463: CPT | Mod: 25

## 2025-08-25 PROCEDURE — G0009: CPT | Mod: 59

## 2025-08-25 PROCEDURE — 80061 LIPID PANEL: CPT

## 2025-08-25 PROCEDURE — 99213 OFFICE O/P EST LOW 20 MIN: CPT | Mod: GE,25

## 2025-08-25 PROCEDURE — 85025 COMPLETE CBC W/AUTO DIFF WBC: CPT

## 2025-08-25 PROCEDURE — 90677 PCV20 VACCINE IM: CPT

## 2025-08-25 PROCEDURE — 90734 MENACWYD/MENACWYCRM VACC IM: CPT

## 2025-08-25 PROCEDURE — 90472 IMMUNIZATION ADMIN EACH ADD: CPT

## 2025-08-25 PROCEDURE — 90750 HZV VACC RECOMBINANT IM: CPT

## 2025-08-25 PROCEDURE — 90471 IMMUNIZATION ADMIN: CPT

## 2025-08-26 LAB
BASOPHILS # BLD AUTO: 0.05 K/UL
BASOPHILS NFR BLD AUTO: 0.8 %
EOSINOPHIL # BLD AUTO: 0.34 K/UL
EOSINOPHIL NFR BLD AUTO: 5.2 %
HCT VFR BLD CALC: 43.8 %
HGB BLD-MCNC: 14.3 G/DL
IMM GRANULOCYTES NFR BLD AUTO: 0.2 %
LYMPHOCYTES # BLD AUTO: 2.23 K/UL
LYMPHOCYTES NFR BLD AUTO: 34 %
MAN DIFF?: NORMAL
MCHC RBC-ENTMCNC: 31.1 PG
MCHC RBC-ENTMCNC: 32.6 G/DL
MCV RBC AUTO: 95.2 FL
MONOCYTES # BLD AUTO: 0.64 K/UL
MONOCYTES NFR BLD AUTO: 9.8 %
NEUTROPHILS # BLD AUTO: 3.29 K/UL
NEUTROPHILS NFR BLD AUTO: 50 %
PLATELET # BLD AUTO: 330 K/UL
RBC # BLD: 4.6 M/UL
RBC # FLD: 12.4 %
WBC # FLD AUTO: 6.56 K/UL

## 2025-08-27 ENCOUNTER — NON-APPOINTMENT (OUTPATIENT)
Age: 50
End: 2025-08-27

## 2025-08-27 DIAGNOSIS — E11.9 TYPE 2 DIABETES MELLITUS W/OUT COMPLICATIONS: ICD-10-CM

## 2025-08-27 LAB
ALBUMIN SERPL ELPH-MCNC: 4.3 G/DL
ALP BLD-CCNC: 84 U/L
ALT SERPL-CCNC: 59 U/L
ANION GAP SERPL CALC-SCNC: 13 MMOL/L
AST SERPL-CCNC: 32 U/L
BILIRUB SERPL-MCNC: 0.5 MG/DL
BUN SERPL-MCNC: 11 MG/DL
CALCIUM SERPL-MCNC: 10 MG/DL
CHLORIDE SERPL-SCNC: 101 MMOL/L
CHOLEST SERPL-MCNC: 185 MG/DL
CO2 SERPL-SCNC: 25 MMOL/L
CREAT SERPL-MCNC: 0.62 MG/DL
EGFRCR SERPLBLD CKD-EPI 2021: 108 ML/MIN/1.73M2
ESTIMATED AVERAGE GLUCOSE: 146 MG/DL
GLUCOSE SERPL-MCNC: 118 MG/DL
HBA1C MFR BLD HPLC: 6.7 %
HDLC SERPL-MCNC: 44 MG/DL
LDLC SERPL-MCNC: 107 MG/DL
NONHDLC SERPL-MCNC: 141 MG/DL
POTASSIUM SERPL-SCNC: 4.7 MMOL/L
PROT SERPL-MCNC: 7.6 G/DL
SODIUM SERPL-SCNC: 139 MMOL/L
TRIGL SERPL-MCNC: 195 MG/DL

## 2025-08-27 RX ORDER — METFORMIN HYDROCHLORIDE 500 MG/1
500 TABLET, COATED ORAL
Qty: 60 | Refills: 3 | Status: ACTIVE | COMMUNITY
Start: 2025-08-27 | End: 1900-01-01

## 2025-09-03 DIAGNOSIS — Z00.00 ENCOUNTER FOR GENERAL ADULT MEDICAL EXAMINATION WITHOUT ABNORMAL FINDINGS: ICD-10-CM

## 2025-09-03 DIAGNOSIS — Z23 ENCOUNTER FOR IMMUNIZATION: ICD-10-CM
